# Patient Record
Sex: FEMALE | Race: WHITE | NOT HISPANIC OR LATINO | Employment: UNEMPLOYED | URBAN - METROPOLITAN AREA
[De-identification: names, ages, dates, MRNs, and addresses within clinical notes are randomized per-mention and may not be internally consistent; named-entity substitution may affect disease eponyms.]

---
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
## 2023-02-03 ENCOUNTER — HOSPITAL ENCOUNTER (EMERGENCY)
Facility: HOSPITAL | Age: 18
Discharge: HOME | End: 2023-02-03
Attending: STUDENT IN AN ORGANIZED HEALTH CARE EDUCATION/TRAINING PROGRAM
Payer: MEDICAID

## 2023-02-03 VITALS
SYSTOLIC BLOOD PRESSURE: 117 MMHG | HEIGHT: 58 IN | WEIGHT: 101 LBS | HEART RATE: 98 BPM | BODY MASS INDEX: 21.2 KG/M2 | DIASTOLIC BLOOD PRESSURE: 77 MMHG | OXYGEN SATURATION: 99 % | RESPIRATION RATE: 16 BRPM | TEMPERATURE: 99 F

## 2023-02-03 DIAGNOSIS — R11.10 HYPEREMESIS: Primary | ICD-10-CM

## 2023-02-03 LAB
ALBUMIN SERPL BCP-MCNC: 3.7 G/DL (ref 3.5–5.2)
ALP SERPL-CCNC: 40 U/L (ref 55–135)
ALT SERPL W/O P-5'-P-CCNC: 21 U/L (ref 10–44)
ANION GAP SERPL CALC-SCNC: 8 MMOL/L (ref 8–16)
AST SERPL-CCNC: 12 U/L (ref 10–40)
BACTERIA #/AREA URNS HPF: NEGATIVE /HPF
BASOPHILS # BLD AUTO: 0.06 K/UL (ref 0–0.2)
BASOPHILS NFR BLD: 0.4 % (ref 0–1.9)
BILIRUB SERPL-MCNC: 0.4 MG/DL (ref 0.1–1)
BILIRUB UR QL STRIP: NEGATIVE
BUN SERPL-MCNC: 8 MG/DL (ref 6–20)
CALCIUM SERPL-MCNC: 9.6 MG/DL (ref 8.7–10.5)
CHLORIDE SERPL-SCNC: 103 MMOL/L (ref 95–110)
CLARITY UR: ABNORMAL
CO2 SERPL-SCNC: 27 MMOL/L (ref 23–29)
COLOR UR: YELLOW
CREAT SERPL-MCNC: 0.6 MG/DL (ref 0.5–1.4)
DIFFERENTIAL METHOD: ABNORMAL
EOSINOPHIL # BLD AUTO: 0.3 K/UL (ref 0–0.5)
EOSINOPHIL NFR BLD: 1.7 % (ref 0–8)
ERYTHROCYTE [DISTWIDTH] IN BLOOD BY AUTOMATED COUNT: 12 % (ref 11.5–14.5)
EST. GFR  (NO RACE VARIABLE): >60 ML/MIN/1.73 M^2
GLUCOSE SERPL-MCNC: 86 MG/DL (ref 70–110)
GLUCOSE UR QL STRIP: NEGATIVE
HCT VFR BLD AUTO: 37.1 % (ref 37–48.5)
HGB BLD-MCNC: 13.1 G/DL (ref 12–16)
HGB UR QL STRIP: ABNORMAL
HYALINE CASTS #/AREA URNS LPF: 0 /LPF
IMM GRANULOCYTES # BLD AUTO: 0.07 K/UL (ref 0–0.04)
IMM GRANULOCYTES NFR BLD AUTO: 0.5 % (ref 0–0.5)
KETONES UR QL STRIP: >=160
LEUKOCYTE ESTERASE UR QL STRIP: NEGATIVE
LYMPHOCYTES # BLD AUTO: 1.6 K/UL (ref 1–4.8)
LYMPHOCYTES NFR BLD: 10.8 % (ref 18–48)
MCH RBC QN AUTO: 31.3 PG (ref 27–31)
MCHC RBC AUTO-ENTMCNC: 35.3 G/DL (ref 32–36)
MCV RBC AUTO: 89 FL (ref 82–98)
MICROSCOPIC COMMENT: ABNORMAL
MONOCYTES # BLD AUTO: 1.2 K/UL (ref 0.3–1)
MONOCYTES NFR BLD: 7.8 % (ref 4–15)
NEUTROPHILS # BLD AUTO: 11.7 K/UL (ref 1.8–7.7)
NEUTROPHILS NFR BLD: 78.8 % (ref 38–73)
NITRITE UR QL STRIP: NEGATIVE
NRBC BLD-RTO: 0 /100 WBC
PH UR STRIP: 7 [PH] (ref 5–8)
PLATELET # BLD AUTO: 340 K/UL (ref 150–450)
PMV BLD AUTO: 9.4 FL (ref 9.2–12.9)
POTASSIUM SERPL-SCNC: 4 MMOL/L (ref 3.5–5.1)
PROT SERPL-MCNC: 7.4 G/DL (ref 6–8.4)
PROT UR QL STRIP: ABNORMAL
RBC # BLD AUTO: 4.18 M/UL (ref 4–5.4)
RBC #/AREA URNS HPF: 9 /HPF (ref 0–4)
SODIUM SERPL-SCNC: 138 MMOL/L (ref 136–145)
SP GR UR STRIP: 1.02 (ref 1–1.03)
SQUAMOUS #/AREA URNS HPF: 10 /HPF
URN SPEC COLLECT METH UR: ABNORMAL
UROBILINOGEN UR STRIP-ACNC: 1 EU/DL
WBC # BLD AUTO: 14.79 K/UL (ref 3.9–12.7)
WBC #/AREA URNS HPF: 9 /HPF (ref 0–5)

## 2023-02-03 PROCEDURE — 85025 COMPLETE CBC W/AUTO DIFF WBC: CPT | Performed by: CLINICAL NURSE SPECIALIST

## 2023-02-03 PROCEDURE — 96374 THER/PROPH/DIAG INJ IV PUSH: CPT

## 2023-02-03 PROCEDURE — 81000 URINALYSIS NONAUTO W/SCOPE: CPT | Performed by: CLINICAL NURSE SPECIALIST

## 2023-02-03 PROCEDURE — 99284 EMERGENCY DEPT VISIT MOD MDM: CPT | Mod: 25

## 2023-02-03 PROCEDURE — 80053 COMPREHEN METABOLIC PANEL: CPT | Performed by: CLINICAL NURSE SPECIALIST

## 2023-02-03 PROCEDURE — 63600175 PHARM REV CODE 636 W HCPCS: Performed by: CLINICAL NURSE SPECIALIST

## 2023-02-03 PROCEDURE — 96361 HYDRATE IV INFUSION ADD-ON: CPT

## 2023-02-03 PROCEDURE — 36415 COLL VENOUS BLD VENIPUNCTURE: CPT | Performed by: CLINICAL NURSE SPECIALIST

## 2023-02-03 PROCEDURE — 25000003 PHARM REV CODE 250: Performed by: CLINICAL NURSE SPECIALIST

## 2023-02-03 RX ORDER — PROCHLORPERAZINE EDISYLATE 5 MG/ML
10 INJECTION INTRAMUSCULAR; INTRAVENOUS ONCE
Status: COMPLETED | OUTPATIENT
Start: 2023-02-03 | End: 2023-02-03

## 2023-02-03 RX ORDER — PROCHLORPERAZINE MALEATE 10 MG
10 TABLET ORAL EVERY 6 HOURS PRN
Qty: 15 TABLET | Refills: 0 | Status: ON HOLD | OUTPATIENT
Start: 2023-02-03 | End: 2023-08-26 | Stop reason: HOSPADM

## 2023-02-03 RX ADMIN — SODIUM CHLORIDE 1000 ML: 9 INJECTION, SOLUTION INTRAVENOUS at 07:02

## 2023-02-03 RX ADMIN — PROCHLORPERAZINE EDISYLATE 10 MG: 5 INJECTION INTRAMUSCULAR; INTRAVENOUS at 07:02

## 2023-02-04 NOTE — ED PROVIDER NOTES
Encounter Date: 2/3/2023       History     Chief Complaint   Patient presents with    Vomiting     9 weeks pregnant, has hyperemesis. Concerned about dehydration.      17-year-old female presents to the emergency room with hyperemesis, concern for dehydration.  Patient is proximally 7 weeks pregnant.  Patient states she has Phenergan at home but it just makes her sleepy does not help her nausea.  Patient also states that she is taking Zofran with no relief.  Patient reports OBGYN in [place].  Patient states she had an ultrasound to confirm placement previously    Review of patient's allergies indicates:  No Known Allergies  Past Medical History:   Diagnosis Date    Hyperemesis gravidarum      Past Surgical History:   Procedure Laterality Date    HERNIA REPAIR      SALPINGOOPHORECTOMY Left 8/16/2022    Procedure: SALPINGO-OOPHORECTOMY;  Surgeon: John Mcbride MD;  Location: [facility];  Service: OB/GYN;  Laterality: Left;  unilateral     TONSILLECTOMY       Family History   Problem Relation Age of Onset    No Known Problems Mother     No Known Problems Father      Social History     Tobacco Use    Smoking status: Some Days     Types: Vaping with nicotine    Smokeless tobacco: Never   Substance Use Topics    Alcohol use: Yes     Comment: occasional    Drug use: Not Currently     Review of Systems   Constitutional:  Negative for fever.   HENT:  Negative for sore throat.    Respiratory:  Negative for shortness of breath.    Cardiovascular:  Negative for chest pain.   Gastrointestinal:  Positive for nausea and vomiting.   Genitourinary:  Negative for dysuria.   Musculoskeletal:  Negative for back pain.   Skin:  Negative for rash.   Neurological:  Negative for weakness.   Hematological:  Does not bruise/bleed easily.   All other systems reviewed and are negative.    Physical Exam     Initial Vitals [02/03/23 1911]   BP Pulse Resp Temp SpO2   117/77 109 18 98.6 °F (37 °C) 99 %      MAP       --         Physical  Exam    Nursing note and vitals reviewed.  Constitutional: She appears well-developed and well-nourished.   HENT:   Head: Normocephalic and atraumatic.   Eyes: Pupils are equal, round, and reactive to light.   Neck:   Normal range of motion.  Cardiovascular:  Normal rate and regular rhythm.           Pulmonary/Chest: Breath sounds normal.   Abdominal: Abdomen is soft. Bowel sounds are normal.   Musculoskeletal:         General: Normal range of motion.      Cervical back: Normal range of motion.     Neurological: She is alert and oriented to person, place, and time.   Skin: Skin is warm and dry.   Psychiatric: She has a normal mood and affect.       ED Course   Procedures  Labs Reviewed   CBC W/ AUTO DIFFERENTIAL - Abnormal; Notable for the following components:       Result Value    WBC 14.79 (*)     MCH 31.3 (*)     Gran # (ANC) 11.7 (*)     Immature Grans (Abs) 0.07 (*)     Mono # 1.2 (*)     Gran % 78.8 (*)     Lymph % 10.8 (*)     All other components within normal limits   COMPREHENSIVE METABOLIC PANEL - Abnormal; Notable for the following components:    Alkaline Phosphatase 40 (*)     All other components within normal limits   URINALYSIS, REFLEX TO URINE CULTURE - Abnormal; Notable for the following components:    Appearance, UA Cloudy (*)     Protein, UA 1+ (*)     Occult Blood UA Trace (*)     All other components within normal limits    Narrative:     Preferred Collection Type->Urine, Clean Catch  Specimen Source->Urine   URINALYSIS MICROSCOPIC - Abnormal; Notable for the following components:    RBC, UA 9 (*)     WBC, UA 9 (*)     All other components within normal limits    Narrative:     Preferred Collection Type->Urine, Clean Catch  Specimen Source->Urine          Imaging Results    None          Medications   sodium chloride 0.9% bolus 1,000 mL 1,000 mL (0 mLs Intravenous Stopped 2/3/23 2036)   prochlorperazine injection Soln 10 mg (10 mg Intravenous Given 2/3/23 1933)     Medical Decision Making:    Differential Diagnosis:   Hyperemesis, dehydration  Clinical Tests:   Lab Tests: Ordered and Reviewed                        Clinical Impression:   Final diagnoses:  [R11.10] Hyperemesis (Primary)        ED Disposition Condition    Discharge Stable          ED Prescriptions       Medication Sig Dispense Start Date End Date Auth. Provider    prochlorperazine (COMPAZINE) 10 MG tablet Take 1 tablet (10 mg total) by mouth every 6 (six) hours as needed (nausea/vomiting during pregnancy). 15 tablet 2/3/2023 -- Cierra Layton NP          Follow-up Information    None          Cierra Layton NP  02/03/23 1926

## 2023-03-17 ENCOUNTER — HOSPITAL ENCOUNTER (EMERGENCY)
Facility: HOSPITAL | Age: 18
Discharge: HOME | End: 2023-03-17
Attending: EMERGENCY MEDICINE
Payer: MEDICAID

## 2023-03-17 VITALS
DIASTOLIC BLOOD PRESSURE: 78 MMHG | BODY MASS INDEX: 21.96 KG/M2 | HEIGHT: 58 IN | HEART RATE: 89 BPM | WEIGHT: 104.63 LBS | TEMPERATURE: 98 F | OXYGEN SATURATION: 99 % | RESPIRATION RATE: 18 BRPM | SYSTOLIC BLOOD PRESSURE: 107 MMHG

## 2023-03-17 DIAGNOSIS — R55 NEAR SYNCOPE: ICD-10-CM

## 2023-03-17 DIAGNOSIS — R51.9 ACUTE NONINTRACTABLE HEADACHE, UNSPECIFIED HEADACHE TYPE: Primary | ICD-10-CM

## 2023-03-17 LAB
CTP QC/QA: YES
SARS-COV-2 RDRP RESP QL NAA+PROBE: NEGATIVE

## 2023-03-17 PROCEDURE — 99283 EMERGENCY DEPT VISIT LOW MDM: CPT

## 2023-03-17 PROCEDURE — 25000003 PHARM REV CODE 250: Performed by: EMERGENCY MEDICINE

## 2023-03-17 RX ORDER — ACETAMINOPHEN 500 MG
1000 TABLET ORAL EVERY 8 HOURS PRN
Qty: 30 TABLET | Refills: 0 | Status: ON HOLD | OUTPATIENT
Start: 2023-03-17 | End: 2023-08-26 | Stop reason: HOSPADM

## 2023-03-17 RX ORDER — BUTALBITAL, ACETAMINOPHEN AND CAFFEINE 50; 325; 40 MG/1; MG/1; MG/1
1 TABLET ORAL
Status: COMPLETED | OUTPATIENT
Start: 2023-03-17 | End: 2023-03-17

## 2023-03-17 RX ADMIN — BUTALBITAL, ACETAMINOPHEN, AND CAFFEINE 1 TABLET: 50; 325; 40 TABLET ORAL at 10:03

## 2023-03-17 NOTE — ED PROVIDER NOTES
EMERGENCY DEPARTMENT HISTORY AND PHYSICAL EXAM     This note is dictated on M*Modal word recognition program.  There are word recognition mistakes and grammatical errors that are occasionally missed on review.     Date: 3/17/2023   Patient Name: Lilly Lockwood       History of Presenting Illness      Chief Complaint   Patient presents with    Headache     Complains of generalized headache since waking up this morning. Patient is 15 wks pregnant. SOHAMCAROLINA Rubio.         [id number]   Lilly Lockwood is a 17 y.o. female with PMHX of  left Salpingo-oophorectom who presents to the emergency department C/O headache.    Patient states she woke up with a headache this morning.  Described as posterior and temporal headache.  No vomiting.  No sick contacts.  No fever.    Patient reports episodes of emesis during this pregnancy and dehydration requiring IV fluids.  She is been tolerating p.o..  She states a couple days ago she got dizzy and lightheaded after fishing.  She almost passed out.  She reports having ear ringing during this episode.      Patient is  at 15 weeks. Normal US this pregnancy.     PCP: [facility] - records        Current Facility-Administered Medications   Medication Dose Route Frequency Provider Last Rate Last Admin    butalbital-acetaminophen-caffeine -40 mg per tablet 1 tablet  1 tablet Oral ED 1 Time Armani Laboy MD         Current Outpatient Medications   Medication Sig Dispense Refill    doxylamine-pyridoxine, vit B6, (DICLEGIS) 10-10 mg TbEC Take 2 tablets by mouth every evening. 20 tablet 0    prochlorperazine (COMPAZINE) 10 MG tablet Take 1 tablet (10 mg total) by mouth every 6 (six) hours as needed (nausea/vomiting during pregnancy). 15 tablet 0    promethazine (PHENERGAN) 25 MG suppository Place 1 suppository (25 mg total) rectally every 6 (six) hours as needed for Nausea. 10 suppository 0           Past History     Past Medical History:   Past Medical  "History:   Diagnosis Date    Hyperemesis gravidarum         Past Surgical History:   Past Surgical History:   Procedure Laterality Date    HERNIA REPAIR      SALPINGOOPHORECTOMY Left 8/16/2022    Procedure: SALPINGO-OOPHORECTOMY;  Surgeon: John Mcbride MD;  [facility];  Service: OB/GYN;  Laterality: Left;  unilateral     TONSILLECTOMY          Family History:   Family History   Problem Relation Age of Onset    No Known Problems Mother     No Known Problems Father         Social History:   Social History     Tobacco Use    Smoking status: Some Days     Types: Vaping with nicotine    Smokeless tobacco: Never   Substance Use Topics    Alcohol use: Yes     Comment: occasional    Drug use: Not Currently        Allergies:   Review of patient's allergies indicates:  No Known Allergies       Review of Systems   Review of Systems   See HPI for pertinent positives and negatives       Physical Exam     Vitals:    03/17/23 0939   BP: 107/78   Pulse: 89   Resp: 18   Temp: 98.3 °F (36.8 °C)   SpO2: 99%   Weight: 47.4 kg (104 lb 9.6 oz)   Height: 4' 10" (1.473 m)      Physical Exam  Vitals and nursing note reviewed.   Constitutional:       General: She is not in acute distress.     Appearance: Normal appearance. She is not ill-appearing.   HENT:      Head: Normocephalic and atraumatic.      Right Ear: External ear normal.      Left Ear: External ear normal.      Nose: Nose normal. No congestion or rhinorrhea.      Mouth/Throat:      Mouth: Mucous membranes are moist.   Eyes:      Conjunctiva/sclera: Conjunctivae normal.      Pupils: Pupils are equal, round, and reactive to light.   Pulmonary:      Effort: Pulmonary effort is normal. No respiratory distress.   Musculoskeletal:         General: No deformity. Normal range of motion.      Cervical back: Normal range of motion. No rigidity.   Skin:     General: Skin is dry.      Capillary Refill: Capillary refill takes less than 2 seconds.   Neurological:      General: No " focal deficit present.      Mental Status: She is alert and oriented to person, place, and time. Mental status is at baseline.      Cranial Nerves: No cranial nerve deficit.      Sensory: No sensory deficit.      Motor: No weakness.      Coordination: Coordination normal.   Psychiatric:         Mood and Affect: Mood normal.         Behavior: Behavior normal.            Diagnostic Study Results      Labs -   No results found for this or any previous visit (from the past 12 hour(s)).     Radiologic Studies -    No orders to display        Medications given in the ED-   Medications   butalbital-acetaminophen-caffeine -40 mg per tablet 1 tablet (has no administration in time range)           Medical Decision Making    I am the first provider for this patient.     I reviewed the vital signs, available nursing notes, past medical history, past surgical history, family history and social history.     Vital Signs:  Reviewed the patient's vital signs.     Pulse Oximetry Analysis and Interpretation:    99% on Room Air, normal        External Test Results (Pertinent to encounter):    Records Reviewed: Nursing Notes, Current Prescription Medications, Old Medical Records, and External Medical Records     History Obtained By: Patient    Provider Notes: Lilly Lockwood is a 17 y.o. female with headache    Co-morbidities Considered: pregnancy    Differential Diagnosis:  Tension headache, migraine, viral syndrome      ED Course:    Patient well-appearing.  Vital signs within normal limits.  No focal neurological deficits.  Able use cellphone without difficulty.  Plan on symptomatic management.  Discussed management of headaches and pregnancy.  Will give dose of Fioricet in ED and patient can take acetaminophen at home as needed for headache.  Will screen for COVID.  No red flag symptoms for headache such as weakness, change sensation, acute nausea and vomiting, neck pain, fever. AVSS.         Problems  Addressed:      Procedures:   Procedures       Diagnosis and Disposition     Critical Care:      DISCHARGE NOTE:       Lilly Lockwood's  results have been reviewed with her.  She has been counseled regarding her diagnosis, treatment, and plan.  She verbally conveys understanding and agreement of the signs, symptoms, diagnosis, treatment and prognosis and additionally agrees to follow up as discussed.  She also agrees with the care-plan and conveys that all of her questions have been answered.  I have also provided discharge instructions for her that include: educational information regarding their diagnosis and treatment, and list of reasons why they would want to return to the ED prior to their follow-up appointment, should her condition change. She has been provided with education for proper emergency department utilization.         CLINICAL IMPRESSION:         1. Acute nonintractable headache, unspecified headache type    2. Near syncope              PLAN:   1. Discharge Home  2.      Medication List        ASK your doctor about these medications      doxylamine-pyridoxine (vit B6) 10-10 mg Tbec  Commonly known as: DICLEGIS  Take 2 tablets by mouth every evening.     prochlorperazine 10 MG tablet  Commonly known as: COMPAZINE  Take 1 tablet (10 mg total) by mouth every 6 (six) hours as needed (nausea/vomiting during pregnancy).     promethazine 25 MG suppository  Commonly known as: PHENERGAN  Place 1 suppository (25 mg total) rectally every 6 (six) hours as needed for Nausea.             3. No follow-up provider specified.     _______________________________     Please note that this dictation was completed with [employer], the computer voice recognition software.  Quite often unanticipated grammatical, syntax, homophones, and other interpretive errors are inadvertently transcribed by the computer software.  Please disregard these errors.  Please excuse any errors that have escaped final proofreading.              Armani Laboy MD  03/17/23 6516

## 2023-08-25 PROBLEM — O47.9 UTERINE CONTRACTIONS DURING PREGNANCY: Status: ACTIVE | Noted: 2023-08-25

## 2023-10-29 NOTE — DISCHARGE INSTRUCTIONS
Information to Prepare you for your Surgery    PRE-ADMIT TESTING -  [phone]    [address] [facility]          Your surgery has been scheduled at [facility]. We are pleased to have the opportunity to serve you. For Further Information please call [phone].    On the day of surgery please report to the Information Desk on the 1st floor.    CONTACT YOUR PHYSICIAN'S OFFICE THE DAY PRIOR TO YOUR SURGERY TO OBTAIN YOUR ARRIVAL TIME.     The evening before surgery do not eat anything after 9 p.m. ( this includes hard candy, chewing gum and mints).  You may only have GATORADE, POWERADE AND WATER  from 9 p.m. until you leave your home.   DO NOT DRINK ANY LIQUIDS ON THE WAY TO THE HOSPITAL.      Why does your anesthesiologist allow you to drink Gatorade/Powerade before surgery?  Gatorade/Powerade helps to increase your comfort before surgery and to decrease your nausea after surgery. The carbohydrates in Gatorade/Powerade help reduce your body's stress response to surgery.  If you are a diabetic-drink only water prior to surgery.      Current Visitor policy(12/27/2021) - Patients may have 2 visitors pre and post procedure. Only 2 visitors will be allowed in the Surgical building with the patient.     SPECIAL MEDICATION INSTRUCTIONS: TAKE medications checked off by the Anesthesiologist on your Medication List.    Angiogram Patients: Take medications as instructed by your physician, including aspirin.     Surgery Patients:    If you take ASPIRIN - Your PHYSICIAN/SURGEON will need to inform you IF/OR when you need to stop taking aspirin prior to your surgery.     Do Not take any medications containing IBUPROFEN.    Do Not Wear any make-up (especially eye make-up) to surgery. Please remove any false eyelashes or eyelash extensions. If you arrive the day of surgery with makeup/eyelashes on you will be required to remove prior to surgery. (There is a risk of corneal  abrasions if eye makeup/eyelash extensions are not removed)      Leave all valuables at home.   Do Not wear any jewelry or watches, including any metal in body piercings. Jewelry must be removed prior to coming to the hospital.  There is a possibility that rings that are unable to be removed may be cut off if they are on the surgical extremity.    Please remove all hair extensions, wigs, clips and any other metal accessories/ ornaments from your hair.  These items may pose a flammable/fire risk in Surgery and must be removed.    Do not shave your surgical area at least 5 days prior to your surgery. The surgical prep will be performed at the hospital according to Infection Control regulations.    Contact Lens must be removed before surgery. Either do not wear the contact lens or bring a case and solution for storage.  Please bring a container for eyeglasses or dentures as required.  Bring any paperwork your physician has provided, such as consent forms,  history and physicals, doctor's orders, etc.   Bring comfortable clothes that are loose fitting to wear upon discharge. Take into consideration the type of surgery being performed.  Maintain your diet as advised per your physician the day prior to surgery.      Adequate rest the night before surgery is advised.   Park in the Parking lot behind the hospital or in the [employer] [facility] across the street from the parking lot. Parking is complimentary.  If you will be discharged the same day as your procedure, please arrange for a responsible adult to drive you home or to accompany you if traveling by taxi.   YOU WILL NOT BE PERMITTED TO DRIVE OR TO LEAVE THE HOSPITAL ALONE AFTER SURGERY.   If you are being discharged the same day, it is strongly recommended that you arrange for someone to remain with you for the first 24 hrs following your surgery.    The Surgeon will speak to your family/visitor after your surgery regarding the outcome of your surgery and post op  care.  The Surgeon may speak to you after your surgery, but there is a possibility you may not remember the details.  Please check with your family members regarding the conversation with the Surgeon.    We strongly recommend whoever is bringing you home be present for discharge instructions.  This will ensure a thorough understanding for your post op home care.    ALL CHILDREN MUST ALWAYS BE ACCOMPANIED BY AN ADULT.    Visitors-Refer to current Visitor policy handouts.    Thank you for your cooperation.  The Staff of [facility].            Bathing Instructions with Hibiclens    Shower the evening before and morning of your procedure with Hibiclens:  Wash your face with water and your regular face wash/soap  Apply Hibiclens directly on your skin or on a wet washcloth and wash gently. When showering: Move away from the shower stream when applying Hibiclens to avoid rinsing off too soon.  Rinse thoroughly with warm water  Do not dilute Hibiclens        Dry off as usual, do not use any deodorant, powder, body lotions, perfume, after shave or cologne.                  No.

## 2023-10-31 ENCOUNTER — HOSPITAL ENCOUNTER (EMERGENCY)
Facility: HOSPITAL | Age: 18
Discharge: HOME | End: 2023-10-31
Attending: STUDENT IN AN ORGANIZED HEALTH CARE EDUCATION/TRAINING PROGRAM
Payer: MEDICAID

## 2023-10-31 VITALS
HEART RATE: 67 BPM | HEIGHT: 59 IN | SYSTOLIC BLOOD PRESSURE: 118 MMHG | WEIGHT: 120 LBS | TEMPERATURE: 99 F | DIASTOLIC BLOOD PRESSURE: 64 MMHG | RESPIRATION RATE: 18 BRPM | BODY MASS INDEX: 24.19 KG/M2 | OXYGEN SATURATION: 100 %

## 2023-10-31 DIAGNOSIS — N20.0 KIDNEY STONE: Primary | ICD-10-CM

## 2023-10-31 LAB
ALBUMIN SERPL BCP-MCNC: 4.2 G/DL (ref 3.5–5.2)
ALP SERPL-CCNC: 70 U/L (ref 55–135)
ALT SERPL W/O P-5'-P-CCNC: 21 U/L (ref 10–44)
ANION GAP SERPL CALC-SCNC: 1 MMOL/L (ref 3–11)
AST SERPL-CCNC: 9 U/L (ref 10–40)
B-HCG UR QL: NEGATIVE
BACTERIA #/AREA URNS HPF: NEGATIVE /HPF
BASOPHILS # BLD AUTO: 0.07 K/UL (ref 0–0.2)
BASOPHILS NFR BLD: 0.7 % (ref 0–1.9)
BILIRUB SERPL-MCNC: 0.2 MG/DL (ref 0.1–1)
BILIRUB UR QL STRIP: NEGATIVE
BUN SERPL-MCNC: 12 MG/DL (ref 6–20)
CALCIUM SERPL-MCNC: 8.9 MG/DL (ref 8.7–10.5)
CHLORIDE SERPL-SCNC: 112 MMOL/L (ref 95–110)
CLARITY UR: CLEAR
CO2 SERPL-SCNC: 27 MMOL/L (ref 23–29)
COLOR UR: YELLOW
CREAT SERPL-MCNC: 1.1 MG/DL (ref 0.5–1.4)
DIFFERENTIAL METHOD: ABNORMAL
EOSINOPHIL # BLD AUTO: 0.3 K/UL (ref 0–0.5)
EOSINOPHIL NFR BLD: 3.1 % (ref 0–8)
ERYTHROCYTE [DISTWIDTH] IN BLOOD BY AUTOMATED COUNT: 15.6 % (ref 11.5–14.5)
EST. GFR  (NO RACE VARIABLE): >60 ML/MIN/1.73 M^2
GLUCOSE SERPL-MCNC: 78 MG/DL (ref 70–110)
GLUCOSE UR QL STRIP: NEGATIVE
HCT VFR BLD AUTO: 37.3 % (ref 37–48.5)
HGB BLD-MCNC: 12 G/DL (ref 12–16)
HGB UR QL STRIP: ABNORMAL
HYALINE CASTS #/AREA URNS LPF: 1.8 /LPF
IMM GRANULOCYTES # BLD AUTO: 0.02 K/UL (ref 0–0.04)
IMM GRANULOCYTES NFR BLD AUTO: 0.2 % (ref 0–0.5)
KETONES UR QL STRIP: NEGATIVE
LEUKOCYTE ESTERASE UR QL STRIP: ABNORMAL
LYMPHOCYTES # BLD AUTO: 3.7 K/UL (ref 1–4.8)
LYMPHOCYTES NFR BLD: 37.8 % (ref 18–48)
MCH RBC QN AUTO: 27.8 PG (ref 27–31)
MCHC RBC AUTO-ENTMCNC: 32.2 G/DL (ref 32–36)
MCV RBC AUTO: 86 FL (ref 82–98)
MICROSCOPIC COMMENT: ABNORMAL
MONOCYTES # BLD AUTO: 1.1 K/UL (ref 0.3–1)
MONOCYTES NFR BLD: 11.6 % (ref 4–15)
NEUTROPHILS # BLD AUTO: 4.5 K/UL (ref 1.8–7.7)
NEUTROPHILS NFR BLD: 46.6 % (ref 38–73)
NITRITE UR QL STRIP: NEGATIVE
NRBC BLD-RTO: 0 /100 WBC
PH UR STRIP: 7 [PH] (ref 5–8)
PLATELET # BLD AUTO: 383 K/UL (ref 150–450)
PMV BLD AUTO: 9.9 FL (ref 9.2–12.9)
POTASSIUM SERPL-SCNC: 4.3 MMOL/L (ref 3.5–5.1)
PROT SERPL-MCNC: 7.3 G/DL (ref 6–8.4)
PROT UR QL STRIP: ABNORMAL
RBC # BLD AUTO: 4.32 M/UL (ref 4–5.4)
RBC #/AREA URNS HPF: 31 /HPF (ref 0–4)
SODIUM SERPL-SCNC: 140 MMOL/L (ref 136–145)
SP GR UR STRIP: 1.02 (ref 1–1.03)
SQUAMOUS #/AREA URNS HPF: 4 /HPF
URN SPEC COLLECT METH UR: ABNORMAL
UROBILINOGEN UR STRIP-ACNC: 1 EU/DL
WBC # BLD AUTO: 9.73 K/UL (ref 3.9–12.7)
WBC #/AREA URNS HPF: 7 /HPF (ref 0–5)

## 2023-10-31 PROCEDURE — 96361 HYDRATE IV INFUSION ADD-ON: CPT

## 2023-10-31 PROCEDURE — 36415 COLL VENOUS BLD VENIPUNCTURE: CPT | Performed by: NURSE PRACTITIONER

## 2023-10-31 PROCEDURE — 63600175 PHARM REV CODE 636 W HCPCS: Performed by: NURSE PRACTITIONER

## 2023-10-31 PROCEDURE — 80053 COMPREHEN METABOLIC PANEL: CPT | Performed by: NURSE PRACTITIONER

## 2023-10-31 PROCEDURE — 99285 EMERGENCY DEPT VISIT HI MDM: CPT | Mod: 25

## 2023-10-31 PROCEDURE — 81000 URINALYSIS NONAUTO W/SCOPE: CPT | Performed by: STUDENT IN AN ORGANIZED HEALTH CARE EDUCATION/TRAINING PROGRAM

## 2023-10-31 PROCEDURE — 25000003 PHARM REV CODE 250: Performed by: NURSE PRACTITIONER

## 2023-10-31 PROCEDURE — 96374 THER/PROPH/DIAG INJ IV PUSH: CPT

## 2023-10-31 PROCEDURE — 81025 URINE PREGNANCY TEST: CPT | Performed by: STUDENT IN AN ORGANIZED HEALTH CARE EDUCATION/TRAINING PROGRAM

## 2023-10-31 PROCEDURE — 85025 COMPLETE CBC W/AUTO DIFF WBC: CPT | Performed by: NURSE PRACTITIONER

## 2023-10-31 RX ORDER — KETOROLAC TROMETHAMINE 30 MG/ML
15 INJECTION, SOLUTION INTRAMUSCULAR; INTRAVENOUS
Status: COMPLETED | OUTPATIENT
Start: 2023-10-31 | End: 2023-10-31

## 2023-10-31 RX ORDER — ONDANSETRON 4 MG/1
4 TABLET, FILM COATED ORAL EVERY 6 HOURS
Qty: 12 TABLET | Refills: 0 | Status: SHIPPED | OUTPATIENT
Start: 2023-10-31 | End: 2023-10-31 | Stop reason: SDUPTHER

## 2023-10-31 RX ORDER — TAMSULOSIN HYDROCHLORIDE 0.4 MG/1
0.4 CAPSULE ORAL
Status: COMPLETED | OUTPATIENT
Start: 2023-10-31 | End: 2023-10-31

## 2023-10-31 RX ORDER — KETOROLAC TROMETHAMINE 10 MG/1
10 TABLET, FILM COATED ORAL EVERY 6 HOURS
Qty: 20 TABLET | Refills: 0 | Status: SHIPPED | OUTPATIENT
Start: 2023-10-31 | End: 2023-11-05

## 2023-10-31 RX ORDER — SODIUM CHLORIDE 9 MG/ML
1000 INJECTION, SOLUTION INTRAVENOUS
Status: COMPLETED | OUTPATIENT
Start: 2023-10-31 | End: 2023-10-31

## 2023-10-31 RX ORDER — HYDROCODONE BITARTRATE AND ACETAMINOPHEN 5; 325 MG/1; MG/1
1 TABLET ORAL EVERY 8 HOURS PRN
Qty: 14 TABLET | Refills: 0 | Status: SHIPPED | OUTPATIENT
Start: 2023-10-31 | End: 2023-11-05

## 2023-10-31 RX ORDER — TAMSULOSIN HYDROCHLORIDE 0.4 MG/1
0.4 CAPSULE ORAL DAILY
Qty: 10 CAPSULE | Refills: 0 | Status: ON HOLD | OUTPATIENT
Start: 2023-10-31 | End: 2023-12-13 | Stop reason: HOSPADM

## 2023-10-31 RX ORDER — HYDROCODONE BITARTRATE AND ACETAMINOPHEN 5; 325 MG/1; MG/1
1 TABLET ORAL EVERY 6 HOURS PRN
Qty: 12 TABLET | Refills: 0 | Status: SHIPPED | OUTPATIENT
Start: 2023-10-31 | End: 2023-10-31 | Stop reason: SDUPTHER

## 2023-10-31 RX ORDER — KETOROLAC TROMETHAMINE 10 MG/1
10 TABLET, FILM COATED ORAL EVERY 6 HOURS
Qty: 12 TABLET | Refills: 0 | Status: SHIPPED | OUTPATIENT
Start: 2023-10-31 | End: 2023-10-31 | Stop reason: SDUPTHER

## 2023-10-31 RX ORDER — ONDANSETRON 4 MG/1
4 TABLET, FILM COATED ORAL EVERY 6 HOURS PRN
Qty: 20 TABLET | Refills: 0 | Status: SHIPPED | OUTPATIENT
Start: 2023-10-31 | End: 2023-11-05

## 2023-10-31 RX ORDER — ONDANSETRON 2 MG/ML
4 INJECTION INTRAMUSCULAR; INTRAVENOUS
Status: DISCONTINUED | OUTPATIENT
Start: 2023-10-31 | End: 2023-10-31 | Stop reason: HOSPADM

## 2023-10-31 RX ADMIN — SODIUM CHLORIDE 1000 ML: 9 INJECTION, SOLUTION INTRAVENOUS at 08:10

## 2023-10-31 RX ADMIN — KETOROLAC TROMETHAMINE 15 MG: 30 INJECTION, SOLUTION INTRAMUSCULAR; INTRAVENOUS at 08:10

## 2023-10-31 RX ADMIN — TAMSULOSIN HYDROCHLORIDE 0.4 MG: 0.4 CAPSULE ORAL at 09:10

## 2023-11-01 NOTE — ED PROVIDER NOTES
"Encounter Date: 10/31/2023       History     Chief Complaint   Patient presents with    Flank Pain     Right flank pain, "sharp" for an hour.       Dysuria   This is a new problem. The current episode started just prior to arrival. The problem occurs every urination. The problem has been rapidly worsening. The quality of the pain is described as shooting and aching. The pain is at a severity of 10/10. She is Sexually active. There is No history of pyelonephritis. Associated symptoms include flank pain. She has tried nothing for the symptoms. Her past medical history is significant for recurrent UTIs.     Review of patient's allergies indicates:  No Known Allergies  Past Medical History:   Diagnosis Date    COVID     HX OF    Hyperemesis gravidarum      Past Surgical History:   Procedure Laterality Date    HERNIA REPAIR      SALPINGOOPHORECTOMY Left 8/16/2022    Procedure: SALPINGO-OOPHORECTOMY;  Surgeon: John Mcbride MD;  Location: [facility];  Service: OB/GYN;  Laterality: Left;  unilateral     TONSILLECTOMY       Family History   Problem Relation Age of Onset    No Known Problems Mother     No Known Problems Father      Social History     Tobacco Use    Smoking status: Former     Types: Vaping with nicotine    Smokeless tobacco: Never   Substance Use Topics    Alcohol use: Not Currently    Drug use: Not Currently     Review of Systems   Genitourinary:  Positive for difficulty urinating, dysuria and flank pain.   All other systems reviewed and are negative.      Physical Exam     Initial Vitals   BP Pulse Resp Temp SpO2   10/31/23 2006 10/31/23 2005 10/31/23 2005 10/31/23 2005 10/31/23 2005   (!) 121/92 106 18 98.6 °F (37 °C) 99 %      MAP       --                Physical Exam    Nursing note and vitals reviewed.  Constitutional: She appears well-developed and well-nourished. She is cooperative.   Tearful and appears uncomfortable   HENT:   Head: Normocephalic and atraumatic.   Eyes: Conjunctivae are normal. "   Cardiovascular:  Normal heart sounds and intact distal pulses.   Tachycardia present.         Pulmonary/Chest: Breath sounds normal.   Abdominal: Abdomen is soft. Bowel sounds are normal.   Musculoskeletal:         General: Normal range of motion.     Neurological: She is alert and oriented to person, place, and time.   Skin: Skin is warm and dry.   Psychiatric: Judgment and thought content normal. Her mood appears anxious. Her speech is rapid and/or pressured. She is hyperactive. Cognition and memory are normal.         ED Course   Procedures  Labs Reviewed   URINALYSIS, REFLEX TO URINE CULTURE - Abnormal; Notable for the following components:       Result Value    Protein, UA Trace (*)     Occult Blood UA 2+ (*)     Leukocytes, UA 1+ (*)     All other components within normal limits    Narrative:     Preferred Collection Type->Urine, Clean Catch  Specimen Source->Urine   URINALYSIS MICROSCOPIC - Abnormal; Notable for the following components:    RBC, UA 31 (*)     WBC, UA 7 (*)     Hyaline Casts, UA 1.8 (*)     All other components within normal limits    Narrative:     Preferred Collection Type->Urine, Clean Catch  Specimen Source->Urine   CBC W/ AUTO DIFFERENTIAL - Abnormal; Notable for the following components:    RDW 15.6 (*)     Mono # 1.1 (*)     All other components within normal limits   COMPREHENSIVE METABOLIC PANEL - Abnormal; Notable for the following components:    Chloride 112 (*)     AST 9 (*)     Anion Gap 1 (*)     All other components within normal limits   PREGNANCY TEST, URINE RAPID    Narrative:     Specimen Source->Urine          Imaging Results              CT Renal Stone Study ABD Pelvis WO (In process)  Result time 10/31/23 20:55:34                       Medications   ondansetron injection 4 mg (0 mg Intravenous Hold 10/31/23 2045)   tamsulosin 24 hr capsule 0.4 mg (has no administration in time range)   0.9%  NaCl infusion (1,000 mLs Intravenous New Bag 10/31/23 2028)   ketorolac  injection 15 mg (15 mg Intravenous Given 10/31/23 2027)     Medical Decision Making  Acute right flank pain this pm and dysuria     Amount and/or Complexity of Data Reviewed  Labs: ordered. Decision-making details documented in ED Course.  Radiology: ordered.  Discussion of management or test interpretation with external provider(s): CT noted. Discussed with my attending. Re-evaluated pt and she is now calm and symptom free. Offered transfer but she would rather go home and try to pass. Strict return for any new or worsening symptoms. Strain urine. If does not pass stone needs to follow-up with urologist of choice or return to ER for further therapies. Patient and S/O voiced understanding. She is not breast feeding and medications sent to pharmacy. Drink plenty of fluids.     Risk  Prescription drug management.               ED Course as of 10/31/23 2130   Tue Oct 31, 2023   2032 Leukocytes, UA(!): 1+ [NA]   2032 NITRITE UA: Negative [NA]   2032 Occult Blood UA(!): 2+ [NA]   2032 RBC, UA(!): 31 [NA]      ED Course User Index  [NA] Yevgeniy Rogers MD                    Clinical Impression:   Final diagnoses:  [N20.0] Kidney stone (Primary)        ED Disposition Condition    Discharge Stable          ED Prescriptions       Medication Sig Dispense Start Date End Date Auth. Provider    tamsulosin (FLOMAX) 0.4 mg Cap Take 1 capsule (0.4 mg total) by mouth once daily. for 10 days 10 capsule 10/31/2023 11/10/2023 Baljeet Milligan NP    ketorolac (TORADOL) 10 mg tablet Take 1 tablet (10 mg total) by mouth every 6 (six) hours. for 3 days 12 tablet 10/31/2023 11/3/2023 Baljeet Milligan NP    HYDROcodone-acetaminophen (NORCO) 5-325 mg per tablet Take 1 tablet by mouth every 6 (six) hours as needed for Pain. 12 tablet 10/31/2023 -- Baljeet Milligan NP    ondansetron (ZOFRAN) 4 MG tablet Take 1 tablet (4 mg total) by mouth every 6 (six) hours. 12 tablet 10/31/2023 -- Baljeet Milligan NP          Follow-up Information       Follow  up With Specialties Details Why Contact Info Additional Information    [place] - Emergency Department Emergency Medicine In 2 days As needed, If symptoms worsen [address] [facility] [address] [phone] Floor 1             Baljeet Milligan, KITTY  10/31/23 1542

## 2023-12-10 ENCOUNTER — HOSPITAL ENCOUNTER (INPATIENT)
Facility: HOSPITAL | Age: 18
LOS: 3 days | Discharge: HOME | DRG: 882 | End: 2023-12-13
Attending: EMERGENCY MEDICINE | Admitting: EMERGENCY MEDICINE
Payer: MEDICAID

## 2023-12-10 DIAGNOSIS — Z00.8 MEDICAL CLEARANCE FOR PSYCHIATRIC ADMISSION: ICD-10-CM

## 2023-12-10 DIAGNOSIS — R45.851 SUICIDAL IDEATION: Primary | ICD-10-CM

## 2023-12-10 LAB
ALBUMIN SERPL BCP-MCNC: 3.6 G/DL (ref 3.5–5.2)
ALP SERPL-CCNC: 44 U/L (ref 55–135)
ALT SERPL W/O P-5'-P-CCNC: 21 U/L (ref 10–44)
AMPHET+METHAMPHET UR QL: NEGATIVE
ANION GAP SERPL CALC-SCNC: 6 MMOL/L (ref 3–11)
APAP SERPL-MCNC: <2 UG/ML (ref 10–20)
AST SERPL-CCNC: 13 U/L (ref 10–40)
B-HCG UR QL: NEGATIVE
BACTERIA #/AREA URNS HPF: NEGATIVE /HPF
BARBITURATES UR QL SCN>200 NG/ML: NEGATIVE
BASOPHILS # BLD AUTO: 0.08 K/UL (ref 0–0.2)
BASOPHILS NFR BLD: 0.6 % (ref 0–1.9)
BENZODIAZ UR QL SCN>200 NG/ML: ABNORMAL
BILIRUB SERPL-MCNC: 0.3 MG/DL (ref 0.1–1)
BILIRUB UR QL STRIP: NEGATIVE
BUN SERPL-MCNC: 14 MG/DL (ref 6–20)
BZE UR QL SCN: NEGATIVE
CALCIUM SERPL-MCNC: 8.6 MG/DL (ref 8.7–10.5)
CANNABINOIDS UR QL SCN: ABNORMAL
CHLORIDE SERPL-SCNC: 108 MMOL/L (ref 95–110)
CLARITY UR: CLEAR
CO2 SERPL-SCNC: 26 MMOL/L (ref 23–29)
COLOR UR: YELLOW
CREAT SERPL-MCNC: 1 MG/DL (ref 0.5–1.4)
CREAT UR-MCNC: 137 MG/DL (ref 15–325)
DIFFERENTIAL METHOD: ABNORMAL
EOSINOPHIL # BLD AUTO: 0 K/UL (ref 0–0.5)
EOSINOPHIL NFR BLD: 0.1 % (ref 0–8)
ERYTHROCYTE [DISTWIDTH] IN BLOOD BY AUTOMATED COUNT: 14.3 % (ref 11.5–14.5)
EST. GFR  (NO RACE VARIABLE): >60 ML/MIN/1.73 M^2
ETHANOL SERPL-MCNC: <3 MG/DL
GLUCOSE SERPL-MCNC: 88 MG/DL (ref 70–110)
GLUCOSE UR QL STRIP: NEGATIVE
HCT VFR BLD AUTO: 35.8 % (ref 37–48.5)
HGB BLD-MCNC: 11.6 G/DL (ref 12–16)
HGB UR QL STRIP: NEGATIVE
HYALINE CASTS #/AREA URNS LPF: 1.5 /LPF
IMM GRANULOCYTES # BLD AUTO: 0.35 K/UL (ref 0–0.04)
IMM GRANULOCYTES NFR BLD AUTO: 2.5 % (ref 0–0.5)
KETONES UR QL STRIP: NEGATIVE
LEUKOCYTE ESTERASE UR QL STRIP: ABNORMAL
LYMPHOCYTES # BLD AUTO: 3 K/UL (ref 1–4.8)
LYMPHOCYTES NFR BLD: 21.4 % (ref 18–48)
MCH RBC QN AUTO: 27.6 PG (ref 27–31)
MCHC RBC AUTO-ENTMCNC: 32.4 G/DL (ref 32–36)
MCV RBC AUTO: 85 FL (ref 82–98)
METHADONE UR QL SCN>300 NG/ML: NEGATIVE
MICROSCOPIC COMMENT: ABNORMAL
MONOCYTES # BLD AUTO: 0.6 K/UL (ref 0.3–1)
MONOCYTES NFR BLD: 3.9 % (ref 4–15)
NEUTROPHILS # BLD AUTO: 10.2 K/UL (ref 1.8–7.7)
NEUTROPHILS NFR BLD: 71.5 % (ref 38–73)
NITRITE UR QL STRIP: NEGATIVE
NRBC BLD-RTO: 0 /100 WBC
OPIATES UR QL SCN: ABNORMAL
PCP UR QL SCN>25 NG/ML: NEGATIVE
PH UR STRIP: 8 [PH] (ref 5–8)
PLATELET # BLD AUTO: 388 K/UL (ref 150–450)
PMV BLD AUTO: 9.9 FL (ref 9.2–12.9)
POTASSIUM SERPL-SCNC: 3.4 MMOL/L (ref 3.5–5.1)
PROT SERPL-MCNC: 7.2 G/DL (ref 6–8.4)
PROT UR QL STRIP: ABNORMAL
RBC # BLD AUTO: 4.21 M/UL (ref 4–5.4)
RBC #/AREA URNS HPF: 2 /HPF (ref 0–4)
SODIUM SERPL-SCNC: 140 MMOL/L (ref 136–145)
SP GR UR STRIP: 1.01 (ref 1–1.03)
SQUAMOUS #/AREA URNS HPF: 6 /HPF
TOXICOLOGY INFORMATION: ABNORMAL
TSH SERPL DL<=0.005 MIU/L-ACNC: 1.33 UIU/ML (ref 0.4–4)
URN SPEC COLLECT METH UR: ABNORMAL
UROBILINOGEN UR STRIP-ACNC: NEGATIVE EU/DL
WBC # BLD AUTO: 14.22 K/UL (ref 3.9–12.7)
WBC #/AREA URNS HPF: 10 /HPF (ref 0–5)

## 2023-12-10 PROCEDURE — 25000003 PHARM REV CODE 250: Performed by: PSYCHIATRY & NEUROLOGY

## 2023-12-10 PROCEDURE — 11400000 HC PSYCH PRIVATE ROOM

## 2023-12-10 PROCEDURE — 85025 COMPLETE CBC W/AUTO DIFF WBC: CPT | Performed by: EMERGENCY MEDICINE

## 2023-12-10 PROCEDURE — 80143 DRUG ASSAY ACETAMINOPHEN: CPT | Performed by: EMERGENCY MEDICINE

## 2023-12-10 PROCEDURE — 99205 PR OFFICE/OUTPT VISIT, NEW, LEVL V, 60-74 MIN: ICD-10-PCS | Mod: 95,AF,HA, | Performed by: PSYCHIATRY & NEUROLOGY

## 2023-12-10 PROCEDURE — 36415 COLL VENOUS BLD VENIPUNCTURE: CPT | Performed by: EMERGENCY MEDICINE

## 2023-12-10 PROCEDURE — 80053 COMPREHEN METABOLIC PANEL: CPT | Performed by: EMERGENCY MEDICINE

## 2023-12-10 PROCEDURE — 82077 ASSAY SPEC XCP UR&BREATH IA: CPT | Performed by: EMERGENCY MEDICINE

## 2023-12-10 PROCEDURE — 81000 URINALYSIS NONAUTO W/SCOPE: CPT | Mod: 59 | Performed by: EMERGENCY MEDICINE

## 2023-12-10 PROCEDURE — 80061 LIPID PANEL: CPT | Performed by: PSYCHIATRY & NEUROLOGY

## 2023-12-10 PROCEDURE — 80307 DRUG TEST PRSMV CHEM ANLYZR: CPT | Performed by: EMERGENCY MEDICINE

## 2023-12-10 PROCEDURE — 99285 EMERGENCY DEPT VISIT HI MDM: CPT

## 2023-12-10 PROCEDURE — 99205 OFFICE O/P NEW HI 60 MIN: CPT | Mod: 95,AF,HA, | Performed by: PSYCHIATRY & NEUROLOGY

## 2023-12-10 PROCEDURE — 83036 HEMOGLOBIN GLYCOSYLATED A1C: CPT | Performed by: PSYCHIATRY & NEUROLOGY

## 2023-12-10 PROCEDURE — 84443 ASSAY THYROID STIM HORMONE: CPT | Performed by: EMERGENCY MEDICINE

## 2023-12-10 PROCEDURE — 81025 URINE PREGNANCY TEST: CPT | Performed by: EMERGENCY MEDICINE

## 2023-12-10 RX ORDER — IBUPROFEN 200 MG
1 TABLET ORAL DAILY PRN
Status: DISCONTINUED | OUTPATIENT
Start: 2023-12-10 | End: 2023-12-13 | Stop reason: HOSPADM

## 2023-12-10 RX ORDER — BENZTROPINE MESYLATE 1 MG/ML
2 INJECTION, SOLUTION INTRAMUSCULAR; INTRAVENOUS EVERY 8 HOURS PRN
Status: DISCONTINUED | OUTPATIENT
Start: 2023-12-10 | End: 2023-12-13 | Stop reason: HOSPADM

## 2023-12-10 RX ORDER — MAG HYDROX/ALUMINUM HYD/SIMETH 200-200-20
30 SUSPENSION, ORAL (FINAL DOSE FORM) ORAL EVERY 6 HOURS PRN
Status: DISCONTINUED | OUTPATIENT
Start: 2023-12-10 | End: 2023-12-13 | Stop reason: HOSPADM

## 2023-12-10 RX ORDER — OLANZAPINE 10 MG/1
10 TABLET ORAL EVERY 8 HOURS PRN
Status: DISCONTINUED | OUTPATIENT
Start: 2023-12-10 | End: 2023-12-13 | Stop reason: HOSPADM

## 2023-12-10 RX ORDER — HYDROXYZINE PAMOATE 50 MG/1
50 CAPSULE ORAL EVERY 6 HOURS PRN
Status: DISCONTINUED | OUTPATIENT
Start: 2023-12-10 | End: 2023-12-13 | Stop reason: HOSPADM

## 2023-12-10 RX ORDER — LOPERAMIDE HYDROCHLORIDE 2 MG/1
2 CAPSULE ORAL
Status: DISCONTINUED | OUTPATIENT
Start: 2023-12-10 | End: 2023-12-13 | Stop reason: HOSPADM

## 2023-12-10 RX ORDER — OLANZAPINE 10 MG/2ML
10 INJECTION, POWDER, FOR SOLUTION INTRAMUSCULAR EVERY 8 HOURS PRN
Status: DISCONTINUED | OUTPATIENT
Start: 2023-12-10 | End: 2023-12-13 | Stop reason: HOSPADM

## 2023-12-10 RX ORDER — PROMETHAZINE HYDROCHLORIDE 25 MG/1
25 TABLET ORAL EVERY 6 HOURS PRN
Status: DISCONTINUED | OUTPATIENT
Start: 2023-12-10 | End: 2023-12-13 | Stop reason: HOSPADM

## 2023-12-10 RX ORDER — ACETAMINOPHEN 325 MG/1
650 TABLET ORAL EVERY 6 HOURS PRN
Status: DISCONTINUED | OUTPATIENT
Start: 2023-12-10 | End: 2023-12-13 | Stop reason: HOSPADM

## 2023-12-10 RX ORDER — FAMOTIDINE 20 MG/1
20 TABLET, FILM COATED ORAL 2 TIMES DAILY
Status: DISCONTINUED | OUTPATIENT
Start: 2023-12-10 | End: 2023-12-13 | Stop reason: HOSPADM

## 2023-12-10 RX ORDER — TALC
6 POWDER (GRAM) TOPICAL NIGHTLY PRN
Status: DISCONTINUED | OUTPATIENT
Start: 2023-12-10 | End: 2023-12-13 | Stop reason: HOSPADM

## 2023-12-10 RX ORDER — ONDANSETRON 4 MG/1
4 TABLET, ORALLY DISINTEGRATING ORAL EVERY 8 HOURS PRN
Status: DISCONTINUED | OUTPATIENT
Start: 2023-12-10 | End: 2023-12-13 | Stop reason: HOSPADM

## 2023-12-10 RX ORDER — ONDANSETRON 4 MG/1
8 TABLET, ORALLY DISINTEGRATING ORAL EVERY 8 HOURS PRN
Status: DISCONTINUED | OUTPATIENT
Start: 2023-12-10 | End: 2023-12-10 | Stop reason: SDUPTHER

## 2023-12-10 RX ORDER — BENZONATATE 100 MG/1
100 CAPSULE ORAL 3 TIMES DAILY PRN
Status: DISCONTINUED | OUTPATIENT
Start: 2023-12-10 | End: 2023-12-13 | Stop reason: HOSPADM

## 2023-12-10 RX ORDER — ACETAMINOPHEN 325 MG/1
650 TABLET ORAL EVERY 6 HOURS PRN
Status: DISCONTINUED | OUTPATIENT
Start: 2023-12-10 | End: 2023-12-10

## 2023-12-10 RX ORDER — ACETAMINOPHEN 325 MG/1
650 TABLET ORAL EVERY 8 HOURS PRN
Status: DISCONTINUED | OUTPATIENT
Start: 2023-12-10 | End: 2023-12-10 | Stop reason: SDUPTHER

## 2023-12-10 RX ADMIN — ACETAMINOPHEN 650 MG: 325 TABLET ORAL at 06:12

## 2023-12-10 NOTE — ED PROVIDER NOTES
"Encounter Date: 12/10/2023       History     Chief Complaint   Patient presents with    Psychiatric Evaluation     Pt to ED via [facility] for psych eval. Patient denies SI/HI. Recent pregnancy. States she was angry with boyfriend and said stupid things she doesn't mean.      18-year-old female presents the emergency department with the police, brought her because she made some suicidal statements to her significant other.  Patient states she is not suicidal, we made a statement because she was angry with him.  They were arguing at home, and states she "made a stupid statement".  Denies HI, denies SI.    Patient allegedly fought with boyfriend and mother over an opiate bottle stating she was going to overdose.  Confirmed by the boyfriend.      Review of patient's allergies indicates:  No Known Allergies  Past Medical History:   Diagnosis Date    COVID     HX OF    Hyperemesis gravidarum      Past Surgical History:   Procedure Laterality Date    HERNIA REPAIR      SALPINGOOPHORECTOMY Left 8/16/2022    Procedure: SALPINGO-OOPHORECTOMY;  Surgeon: John Mcbride MD;  Location: [facility];  Service: OB/GYN;  Laterality: Left;  unilateral     TONSILLECTOMY       Family History   Problem Relation Age of Onset    No Known Problems Mother     No Known Problems Father      Social History     Tobacco Use    Smoking status: Former     Types: Vaping with nicotine    Smokeless tobacco: Never   Substance Use Topics    Alcohol use: Not Currently    Drug use: Not Currently     Review of Systems   Constitutional:  Negative for fever.   HENT:  Negative for sore throat.    Respiratory:  Negative for shortness of breath.    Cardiovascular:  Negative for chest pain.   Gastrointestinal:  Negative for nausea.   Genitourinary:  Negative for dysuria.   Musculoskeletal:  Negative for back pain.   Skin:  Negative for rash.   Neurological:  Negative for weakness.   Hematological:  Does not bruise/bleed easily.   All other systems reviewed and are " negative.      Physical Exam     Initial Vitals [12/10/23 1528]   BP Pulse Resp Temp SpO2   130/62 (!) 114 18 98 °F (36.7 °C) 99 %      MAP       --         Physical Exam    Nursing note and vitals reviewed.  Constitutional: She appears well-developed and well-nourished. She is not diaphoretic. No distress.   HENT:   Head: Normocephalic and atraumatic.   Eyes: Conjunctivae and EOM are normal. Pupils are equal, round, and reactive to light.   Neck: Neck supple.   Normal range of motion.  Cardiovascular:  Normal rate, regular rhythm, normal heart sounds and intact distal pulses.           No murmur heard.  Pulmonary/Chest: Breath sounds normal. No respiratory distress. She has no wheezes. She has no rhonchi. She has no rales. She exhibits no tenderness.   Abdominal: Abdomen is soft. Bowel sounds are normal.   Musculoskeletal:         General: No tenderness or edema. Normal range of motion.      Cervical back: Normal range of motion and neck supple.     Neurological: She is alert and oriented to person, place, and time. She has normal strength. No cranial nerve deficit. GCS score is 15. GCS eye subscore is 4. GCS verbal subscore is 5. GCS motor subscore is 6.   Skin: Skin is warm and dry. Capillary refill takes less than 2 seconds.   Psychiatric: Her speech is normal and behavior is normal. Her mood appears anxious. She is not actively hallucinating. Thought content is not paranoid and not delusional. Cognition and memory are normal. She expresses no homicidal and no suicidal ideation. She expresses no suicidal plans and no homicidal plans. She is attentive.         ED Course   Procedures  Labs Reviewed   CBC W/ AUTO DIFFERENTIAL - Abnormal; Notable for the following components:       Result Value    WBC 14.22 (*)     Hemoglobin 11.6 (*)     Hematocrit 35.8 (*)     Immature Granulocytes 2.5 (*)     Gran # (ANC) 10.2 (*)     Immature Grans (Abs) 0.35 (*)     Mono % 3.9 (*)     All other components within normal limits    COMPREHENSIVE METABOLIC PANEL - Abnormal; Notable for the following components:    Potassium 3.4 (*)     Calcium 8.6 (*)     Alkaline Phosphatase 44 (*)     All other components within normal limits   URINALYSIS, REFLEX TO URINE CULTURE - Abnormal; Notable for the following components:    Protein, UA 1+ (*)     Leukocytes, UA Trace (*)     All other components within normal limits    Narrative:     Preferred Collection Type->Urine, Clean Catch  Specimen Source->Urine   DRUG SCREEN PANEL, URINE EMERGENCY - Abnormal; Notable for the following components:    Benzodiazepines Presumptive Positive (*)     Opiate Scrn, Ur Presumptive Positive (*)     THC Presumptive Positive (*)     All other components within normal limits    Narrative:     Preferred Collection Type->Urine, Clean Catch  Specimen Source->Urine   ACETAMINOPHEN LEVEL - Abnormal; Notable for the following components:    Acetaminophen (Tylenol), Serum <2.0 (*)     All other components within normal limits   URINALYSIS MICROSCOPIC - Abnormal; Notable for the following components:    WBC, UA 10 (*)     Hyaline Casts, UA 1.5 (*)     All other components within normal limits    Narrative:     Preferred Collection Type->Urine, Clean Catch  Specimen Source->Urine   TSH   ALCOHOL,MEDICAL (ETHANOL)   PREGNANCY TEST, URINE RAPID    Narrative:     Specimen Source->Urine          Imaging Results    None          Medications - No data to display  Medical Decision Making  Amount and/or Complexity of Data Reviewed  Labs: ordered.    Risk  Decision regarding hospitalization.               ED Course as of 12/10/23 1701   Sun Dec 10, 2023   1659 Discussed case with Dr. Quintero, agrees to admission for acute psychiatric care and treatment [SD]      ED Course User Index  [SD] Bigg Joseph MD               Medical Decision Making:   Differential Diagnosis:   Domestic issue, depression, suicidal statement  ED Management:  Tele psych performed, recommends [facility] for suicidal  statement and threat.  Also because she is in the postpartum period, and no outpatient provider follow up             Clinical Impression:  Final diagnoses:  [R45.851] Suicidal ideation (Primary)  [Z00.8] Medical clearance for psychiatric admission          ED Disposition Condition    Admit Stable                Bigg Joseph MD  12/10/23 1650       Bigg Joseph MD  12/10/23 1701

## 2023-12-10 NOTE — CONSULTS
[facility]  Psychiatry  Telepsychiatry Consult Note    Please see previous notes:    Patient agreeable to consultation via telepsychiatry.    Tele-Consultation from Psychiatry started: 12/10/2023 at [id number]  The chief complaint leading to psychiatric consultation is: suicidality  This consultation was requested by Dr. Joseph, the Emergency Department attending physician.  The location of the consulting psychiatrist is  [facility] .  The patient location is  [facility] EMERGENCY DEPARTMENT   The patient arrived at the ED at: this afternoon    Also present with the patient at the time of the consultation: seen alone    Patient Identification:   Lilly Lockwood is a 18 y.o. female.    Patient information was obtained from patient, parent, and past medical records.  Patient presented involuntarily to the Emergency Department      Consults  Teleconsult Time Documentation  Subjective:     History of Present Illness:  The patient is a 18 year old woman who denies any past psychiatric history to me who is 3 months postpartum sent in by police after her family called 911 because of suicidal statements and or behavior today. The patient essentially denies that she is suicidal and or needs hospitalization. She does state that she noticed she is more irritable for the past couple of months. She noticed that she is easily triggered. She admits that she can lose her temper at times. She attributes this to her boyfriend lacking empathy for her situation as a new mother whose body has gone through significant changes this year. She states that he often ignores her or antagonizes her and therefore triggers her anger outbursts. She admits to having made a number of statements today and says she can't remember all of them and may have said that she wanted to kill herself but denies that she actually did. She says that they had an argument about a trivial matter and she left the house to take the baby to her mother in  law's. She says her boyfriend then called her to demand she returned the car since it's in his name. She says she went and took the car back and tried to talk to him but he wouldn't listen to her and she became angry and then made the statements. She says that he recorded her and subsequently paid played the recording for her mother who is the person who called the police. She denies any history of depression anxiety past counseling past hospitalization past suicide attempts or past medication trials. She states that she's getting 11:50 hours of sleep per night because her three month old is sleeping well per night overnight. She denies any problems with energy level appetite concentration or self esteem. She states it is good to be alive. No psychotic symptoms. I asked her what her boyfriend and mother were likely to say when I called them for collateral and she replied that they would say she is crazy however she's unable to elaborate. New paragraph I called her mother April. Her mother states that the patient has been easily triggered and overly emotional for three to four years period she describes her daughter as manipulative often throwing what she calls adult style temper tantrums when she doesn't get her way etc. She attributes some of this to her daughter using marijuana. She states there was in fact a hospitalization when she was 12. She says she caught her daughter using cannabis and the patient stated she was suicidal. However once she got to the hospital she stated that it was just a joke and she had just said it to get away from her mom because she was afraid she would be severely punished. Mom does describe an escalation in the intensity and frequency of the outbursts over the past one to two months with expressions of a desire to die now several times per week. She's concerned because of the new baby at home. She states that the patient doesn't seem to be very connected to her grandson. She states  "that she is trying to get babysitters as much as possible and not spending very much time with him. She further states that the boyfriend Shaheen told her that he and or the police had to wrestle pain pills away from the patient today in order to prevent her from overdosing in a suicide attempt. I had called the boyfriend but was unable to reach him and so I don't have any first hand confirmation of this information.   April says that Shaheen has been sending her recording of the patient's outbursts more than weekly in recent weeks out of concern that the patient is worsening/decompensating.      ADDENDUM - reached Shaheen who confirmed the wrestling away of the pill bottle (she had her wisdom teeth out last week - opioids and antinausea medication plus antibiotics) this afternoon but states the patient is a good mom to their baby and no concerns there    Psychiatric History:   Previous Psychiatric Hospitalizations: Yes    Previous Medication Trials: No    Previous Suicide Attempts: no    History of Violence: throws objects  History of Depression: no  History of Annette: no  History of Auditory/Visual Hallucination no  History of Delusions: no  Outpatient psychiatrist (current & past): No    Substance Abuse History:  Tobacco:No  Alcohol: No  Illicit Substances:cannabis  Detox/Rehab: No    Legal History: Past charges/incarcerations: No     Family Psychiatric History: unknown      Social History:   HS graduate.  Lives with boyfrienjameson Jamison and their 3 month old son.  Stay at home mom.      Psychiatric Mental Status Exam:  Arousal: alert  Sensorium/Orientation: oriented to grossly intact  Behavior/Cooperation: normal, cooperative   Speech: normal tone, normal rate, normal pitch, normal volume  Language: grossly intact  Mood: " im wang "   Affect: appropriate  Thought Process: normal and logical  Thought Content:   Auditory hallucinations: NO  Visual hallucinations: NO  Paranoia: NO  Delusions:  NO  Suicidal ideation: YES: " denies now but earlier today yes     Homicidal ideation: NO  Attention/Concentration:  intact  Memory:    Recent:  Intact   Remote: Intact   3/3 immediate,  /3 at 5 min  Fund of Knowledge: Aware of current events   Abstract reasoning:   Insight: poor awareness of illness  Judgment: limited      Past Medical History:   Past Medical History:   Diagnosis Date    COVID     HX OF    Hyperemesis gravidarum       Laboratory Data:   Labs Reviewed   CBC W/ AUTO DIFFERENTIAL - Abnormal; Notable for the following components:       Result Value    WBC 14.22 (*)     Hemoglobin 11.6 (*)     Hematocrit 35.8 (*)     Immature Granulocytes 2.5 (*)     Gran # (ANC) 10.2 (*)     Immature Grans (Abs) 0.35 (*)     Mono % 3.9 (*)     All other components within normal limits   COMPREHENSIVE METABOLIC PANEL - Abnormal; Notable for the following components:    Potassium 3.4 (*)     Calcium 8.6 (*)     Alkaline Phosphatase 44 (*)     All other components within normal limits   URINALYSIS, REFLEX TO URINE CULTURE - Abnormal; Notable for the following components:    Protein, UA 1+ (*)     Leukocytes, UA Trace (*)     All other components within normal limits    Narrative:     Preferred Collection Type->Urine, Clean Catch  Specimen Source->Urine   DRUG SCREEN PANEL, URINE EMERGENCY - Abnormal; Notable for the following components:    Benzodiazepines Presumptive Positive (*)     Opiate Scrn, Ur Presumptive Positive (*)     THC Presumptive Positive (*)     All other components within normal limits    Narrative:     Preferred Collection Type->Urine, Clean Catch  Specimen Source->Urine   ACETAMINOPHEN LEVEL - Abnormal; Notable for the following components:    Acetaminophen (Tylenol), Serum <2.0 (*)     All other components within normal limits   URINALYSIS MICROSCOPIC - Abnormal; Notable for the following components:    WBC, UA 10 (*)     Hyaline Casts, UA 1.5 (*)     All other components within normal limits    Narrative:     Preferred Collection  Type->Urine, Clean Catch  Specimen Source->Urine   TSH   ALCOHOL,MEDICAL (ETHANOL)   PREGNANCY TEST, URINE RAPID    Narrative:     Specimen Source->Urine          Allergies:    Review of patient's allergies indicates:  No Known Allergies    Medications in ER: Medications - No data to display    Medications at home: none    No new subjective & objective note has been filed under this hospital service since the last note was generated.      Assessment - Diagnosis - Goals:     Diagnosis/Impression:   suspect underlying [facility]PD.  Worsening mood instability now and high risk for post partum depression.  No providers in the community and attempted to get a bottle of opioids today in apparent suicide attempt vs gesture.      Rec: [facility] for stabilization and treatment.       Time with patient: 40 min      More than 50% of the time was spent counseling/coordinating care    Consulting clinician was informed of the encounter and consult note.    Consultation ended: 12/10/2023 at 447 pm    Rosalinda Vickers MD   Psychiatry  [facility]

## 2023-12-11 PROBLEM — R45.851 DEPRESSION WITH SUICIDAL IDEATION: Status: ACTIVE | Noted: 2023-12-11

## 2023-12-11 PROBLEM — F12.10 TETRAHYDROCANNABINOL (THC) USE DISORDER, MILD, ABUSE: Status: RESOLVED | Noted: 2023-12-11 | Resolved: 2023-12-11

## 2023-12-11 PROBLEM — F12.10 TETRAHYDROCANNABINOL (THC) USE DISORDER, MILD, ABUSE: Status: ACTIVE | Noted: 2023-12-11

## 2023-12-11 PROBLEM — F19.10 POLYSUBSTANCE ABUSE: Status: ACTIVE | Noted: 2023-12-11

## 2023-12-11 PROBLEM — F32.A DEPRESSION WITH SUICIDAL IDEATION: Status: ACTIVE | Noted: 2023-12-11

## 2023-12-11 PROBLEM — R45.851 VERBALIZES SUICIDAL THOUGHTS: Status: ACTIVE | Noted: 2023-12-11

## 2023-12-11 LAB
CHOLEST SERPL-MCNC: 168 MG/DL (ref 120–199)
CHOLEST/HDLC SERPL: 4.1 {RATIO} (ref 2–5)
ESTIMATED AVG GLUCOSE: 103 MG/DL (ref 68–131)
HBA1C MFR BLD: 5.2 % (ref 4–5.6)
HDLC SERPL-MCNC: 41 MG/DL (ref 40–75)
HDLC SERPL: 24.4 % (ref 20–50)
LDLC SERPL CALC-MCNC: 100.8 MG/DL (ref 63–159)
NONHDLC SERPL-MCNC: 127 MG/DL
TRIGL SERPL-MCNC: 131 MG/DL (ref 30–150)

## 2023-12-11 PROCEDURE — 99222 PR INITIAL HOSPITAL CARE,LEVL II: ICD-10-PCS | Mod: AF,HA,, | Performed by: PSYCHIATRY & NEUROLOGY

## 2023-12-11 PROCEDURE — 90833 PSYTX W PT W E/M 30 MIN: CPT | Mod: AF,HA,, | Performed by: PSYCHIATRY & NEUROLOGY

## 2023-12-11 PROCEDURE — 25000003 PHARM REV CODE 250: Performed by: EMERGENCY MEDICINE

## 2023-12-11 PROCEDURE — 36415 COLL VENOUS BLD VENIPUNCTURE: CPT | Performed by: PSYCHIATRY & NEUROLOGY

## 2023-12-11 PROCEDURE — 25000003 PHARM REV CODE 250: Performed by: PSYCHIATRY & NEUROLOGY

## 2023-12-11 PROCEDURE — 11400000 HC PSYCH PRIVATE ROOM

## 2023-12-11 PROCEDURE — 99222 1ST HOSP IP/OBS MODERATE 55: CPT | Mod: AF,HA,, | Performed by: PSYCHIATRY & NEUROLOGY

## 2023-12-11 PROCEDURE — 25000003 PHARM REV CODE 250: Performed by: INTERNAL MEDICINE

## 2023-12-11 PROCEDURE — 90833 PR PSYCHOTHERAPY W/PATIENT W/E&M, 30 MIN (ADD ON): ICD-10-PCS | Mod: AF,HA,, | Performed by: PSYCHIATRY & NEUROLOGY

## 2023-12-11 RX ORDER — PENICILLIN V POTASSIUM 250 MG/1
500 TABLET, FILM COATED ORAL 4 TIMES DAILY
Status: DISCONTINUED | OUTPATIENT
Start: 2023-12-11 | End: 2023-12-13 | Stop reason: HOSPADM

## 2023-12-11 RX ADMIN — FAMOTIDINE 20 MG: 20 TABLET, FILM COATED ORAL at 08:12

## 2023-12-11 RX ADMIN — PENICILLIN V POTASSIUM 500 MG: 250 TABLET, FILM COATED ORAL at 08:12

## 2023-12-11 RX ADMIN — PENICILLIN V POTASSIUM 500 MG: 250 TABLET, FILM COATED ORAL at 04:12

## 2023-12-11 NOTE — SUBJECTIVE & OBJECTIVE
Past Medical History:   Diagnosis Date    COVID     HX OF    Hyperemesis gravidarum        Past Surgical History:   Procedure Laterality Date    HERNIA REPAIR      SALPINGOOPHORECTOMY Left 8/16/2022    Procedure: SALPINGO-OOPHORECTOMY;  Surgeon: John Mcbride MD;  Location: [facility];  Service: OB/GYN;  Laterality: Left;  unilateral     TONSILLECTOMY         Review of patient's allergies indicates:  No Known Allergies    No current facility-administered medications on file prior to encounter.     Current Outpatient Medications on File Prior to Encounter   Medication Sig    PNV,calcium 72/iron/folic acid (PRENATAL VITAMIN) Tab Take 2 tablets by mouth once daily. TAKES 2 GUMMIES DAILY    tamsulosin (FLOMAX) 0.4 mg Cap Take 1 capsule (0.4 mg total) by mouth once daily. for 10 days     Family History       Problem Relation (Age of Onset)    No Known Problems Mother, Father          Tobacco Use    Smoking status: Former     Types: Vaping with nicotine    Smokeless tobacco: Never   Substance and Sexual Activity    Alcohol use: Not Currently    Drug use: Not Currently    Sexual activity: Not Currently     Review of Systems   Constitutional:  Negative for chills and fever.   HENT:  Negative for rhinorrhea, sneezing and sore throat.    Eyes:  Negative for pain and visual disturbance.   Respiratory:  Negative for cough and shortness of breath.    Cardiovascular:  Negative for chest pain.   Gastrointestinal:  Negative for abdominal pain, constipation and diarrhea.   Endocrine: Negative for cold intolerance and heat intolerance.   Genitourinary:  Negative for difficulty urinating.   Musculoskeletal:  Negative for arthralgias and joint swelling.   Skin:  Negative for rash.   Allergic/Immunologic: Negative for environmental allergies.   Neurological:  Negative for dizziness, syncope and weakness.   Hematological:  Does not bruise/bleed easily.   Psychiatric/Behavioral:  Negative for dysphoric mood. The patient is not  nervous/anxious.      Objective:     Vital Signs (Most Recent):  Temp: 98.9 °F (37.2 °C) (12/11/23 0748)  Pulse: 103 (12/11/23 0748)  Resp: 18 (12/11/23 0748)  BP: 130/83 (12/11/23 0748)  SpO2: 100 % (12/11/23 0748) Vital Signs (24h Range):  Temp:  [97.8 °F (36.6 °C)-99.2 °F (37.3 °C)] 98.9 °F (37.2 °C)  Pulse:  [] 103  Resp:  [16-20] 18  SpO2:  [99 %-100 %] 100 %  BP: (109-130)/(59-83) 130/83     Weight: 54.4 kg (120 lb)  Body mass index is 24.24 kg/m².     Physical Exam  Vitals and nursing note reviewed.   Constitutional:       Appearance: Normal appearance.   HENT:      Head: Normocephalic and atraumatic.      Nose: Nose normal.      Mouth/Throat:      Mouth: Mucous membranes are moist.      Pharynx: Oropharynx is clear.   Eyes:      Extraocular Movements: Extraocular movements intact.      Conjunctiva/sclera: Conjunctivae normal.      Pupils: Pupils are equal, round, and reactive to light.   Cardiovascular:      Rate and Rhythm: Normal rate and regular rhythm.      Pulses: Normal pulses.      Heart sounds: Normal heart sounds.   Pulmonary:      Effort: Pulmonary effort is normal.      Breath sounds: Normal breath sounds.   Abdominal:      General: Abdomen is flat. Bowel sounds are normal.      Palpations: Abdomen is soft.   Musculoskeletal:         General: Normal range of motion.      Cervical back: Normal range of motion and neck supple.   Skin:     General: Skin is warm and dry.      Capillary Refill: Capillary refill takes less than 2 seconds.      Comments: No rashes on limited skin exam.   Neurological:      General: No focal deficit present.      Mental Status: She is alert and oriented to person, place, and time.      Cranial Nerves: No cranial nerve deficit.      Comments: I Olfactory:  Sense of smell intact    II Optic:  Pupils equal round react to light.  Vision intact.    III, IV, VI, Ocular motor, Trochlear, Abducens:  Extraocular movements intact    V Trigeminal:  Facial sensation intact  facial sensation intact,, muscles of mastication intact muscles of mastication intact, corneal reflex intact, corneal reflex intact    VII Facial:  Muscles of facial expression intact     VIII Vestibular cochlear: Hearing intact vestibular cochlear: Hearing intact    IX Glossopharyngeal:  Gag reflex intact.  Tasting intact.     X Vagus:  Gag reflex intact.    XI Spinal Accessory:  Shoulder shrug intact.  Head rotation intact.    XII Hypoglossal:  Tongue movements intact.     Psychiatric:         Mood and Affect: Mood is anxious.      Comments: Patient appears depressed              CRANIAL NERVES     CN III, IV, VI   Pupils are equal, round, and reactive to light.       Significant Labs: All pertinent labs within the past 24 hours have been reviewed.    Significant Imaging: I have reviewed all pertinent imaging results/findings within the past 24 hours.

## 2023-12-11 NOTE — HPI
"  Encounter Date: 12/10/2023        History           Chief Complaint   Patient presents with    Psychiatric Evaluation       Pt to ED via [facility] for psych eval. Patient denies SI/HI. Recent pregnancy. States she was angry with boyfriend and said stupid things she doesn't mean.       18-year-old female presents the emergency department with the police, brought her because she made some suicidal statements to her significant other.  Patient states she is not suicidal, we made a statement because she was angry with him.  They were arguing at home, and states she "made a stupid statement".  Denies HI, denies SI.     Patient allegedly fought with boyfriend and mother over an opiate bottle stating she was going to overdose.  Confirmed by the boyfriend.        Review of patient's allergies indicates:  No Known Allergies       Past Medical History:   Diagnosis Date    COVID       HX OF    Hyperemesis gravidarum              Past Surgical History:   Procedure Laterality Date    HERNIA REPAIR        SALPINGOOPHORECTOMY Left 8/16/2022     Procedure: SALPINGO-OOPHORECTOMY;  Surgeon: John Mcbride MD;  Location: [facility];  Service: OB/GYN;  Laterality: Left;  unilateral     TONSILLECTOMY                Family History   Problem Relation Age of Onset    No Known Problems Mother      No Known Problems Father              "

## 2023-12-11 NOTE — PLAN OF CARE
"Lilly Lockwood admitted to [facility] under the care of Rhett Quintero MD with diagnosis of depression with suicidal ideations. [facility] Patient is a 17yo female who presented to [facility] ER with the police.    Patient depressed, calm, cooperative, anxious, and irritable. Denies Suicidal Ideation, Homicidal Ideation, Auditory Hallucinations, Visual Hallucinations, Tactile Hallucinations, Gustatory Hallucinations, and Delusions.  19 yo female presents to the [facility] ER with the police, brought her because she made some suicidal statements to her significant other. Patient was fighting over a bottle of opiates threathening to overdose. Patient states she is not suicidal, I made a statement because I was angry with him. They were arguing at home and states she "made a stupid statement. Patient UDS was positive for benzos, opiates, and THC. Patient allegedly fought with the boyfriend and mother over an opiate bottle stating she wa sgoing to overdose.     Patient assessed and oriented to room unit and routine. Patient denies pain or discomfort at present and remains injury-free.     DAVEY GRACIA RN    "

## 2023-12-11 NOTE — H&P
"  [facility] Hospital Medicine  History & Physical    Patient Name: Lilly Lockwood  MRN: [id number]  Patient Class: IP- Psych  Admission Date: 12/10/2023  Attending Physician: Rhett Quintero MD   Primary Care Provider: [facility]         Patient information was obtained from ER records.     Subjective:     Principal Problem:<principal problem not specified>    Chief Complaint:   Chief Complaint   Patient presents with    Psychiatric Evaluation     Pt to ED via [facility] for psych eval. Patient denies SI/HI. Recent pregnancy. States she was angry with boyfriend and said stupid things she doesn't mean.         HPI:     Encounter Date: 12/10/2023        History           Chief Complaint   Patient presents with    Psychiatric Evaluation       Pt to ED via [facility] for psych eval. Patient denies SI/HI. Recent pregnancy. States she was angry with boyfriend and said stupid things she doesn't mean.       18-year-old female presents the emergency department with the police, brought her because she made some suicidal statements to her significant other.  Patient states she is not suicidal, we made a statement because she was angry with him.  They were arguing at home, and states she "made a stupid statement".  Denies HI, denies SI.     Patient allegedly fought with boyfriend and mother over an opiate bottle stating she was going to overdose.  Confirmed by the boyfriend.        Review of patient's allergies indicates:  No Known Allergies       Past Medical History:   Diagnosis Date    COVID       HX OF    Hyperemesis gravidarum              Past Surgical History:   Procedure Laterality Date    HERNIA REPAIR        SALPINGOOPHORECTOMY Left 8/16/2022     Procedure: SALPINGO-OOPHORECTOMY;  Surgeon: John Mcbride MD;  Location: [facility];  Service: OB/GYN;  Laterality: Left;  unilateral     TONSILLECTOMY                Family History   Problem Relation Age of Onset    No Known Problems " Mother      No Known Problems Father                Past Medical History:   Diagnosis Date    COVID     HX OF    Hyperemesis gravidarum        Past Surgical History:   Procedure Laterality Date    HERNIA REPAIR      SALPINGOOPHORECTOMY Left 8/16/2022    Procedure: SALPINGO-OOPHORECTOMY;  Surgeon: John Mcbride MD;  Location: [facility];  Service: OB/GYN;  Laterality: Left;  unilateral     TONSILLECTOMY         Review of patient's allergies indicates:  No Known Allergies    No current facility-administered medications on file prior to encounter.     Current Outpatient Medications on File Prior to Encounter   Medication Sig    PNV,calcium 72/iron/folic acid (PRENATAL VITAMIN) Tab Take 2 tablets by mouth once daily. TAKES 2 GUMMIES DAILY    tamsulosin (FLOMAX) 0.4 mg Cap Take 1 capsule (0.4 mg total) by mouth once daily. for 10 days     Family History       Problem Relation (Age of Onset)    No Known Problems Mother, Father          Tobacco Use    Smoking status: Former     Types: Vaping with nicotine    Smokeless tobacco: Never   Substance and Sexual Activity    Alcohol use: Not Currently    Drug use: Not Currently    Sexual activity: Not Currently     Review of Systems   Constitutional:  Negative for chills and fever.   HENT:  Negative for rhinorrhea, sneezing and sore throat.    Eyes:  Negative for pain and visual disturbance.   Respiratory:  Negative for cough and shortness of breath.    Cardiovascular:  Negative for chest pain.   Gastrointestinal:  Negative for abdominal pain, constipation and diarrhea.   Endocrine: Negative for cold intolerance and heat intolerance.   Genitourinary:  Negative for difficulty urinating.   Musculoskeletal:  Negative for arthralgias and joint swelling.   Skin:  Negative for rash.   Allergic/Immunologic: Negative for environmental allergies.   Neurological:  Negative for dizziness, syncope and weakness.   Hematological:  Does not bruise/bleed easily.   Psychiatric/Behavioral:   Negative for dysphoric mood. The patient is not nervous/anxious.      Objective:     Vital Signs (Most Recent):  Temp: 98.9 °F (37.2 °C) (12/11/23 0748)  Pulse: 103 (12/11/23 0748)  Resp: 18 (12/11/23 0748)  BP: 130/83 (12/11/23 0748)  SpO2: 100 % (12/11/23 0748) Vital Signs (24h Range):  Temp:  [97.8 °F (36.6 °C)-99.2 °F (37.3 °C)] 98.9 °F (37.2 °C)  Pulse:  [] 103  Resp:  [16-20] 18  SpO2:  [99 %-100 %] 100 %  BP: (109-130)/(59-83) 130/83     Weight: 54.4 kg (120 lb)  Body mass index is 24.24 kg/m².     Physical Exam  Vitals and nursing note reviewed.   Constitutional:       Appearance: Normal appearance.   HENT:      Head: Normocephalic and atraumatic.      Nose: Nose normal.      Mouth/Throat:      Mouth: Mucous membranes are moist.      Pharynx: Oropharynx is clear.   Eyes:      Extraocular Movements: Extraocular movements intact.      Conjunctiva/sclera: Conjunctivae normal.      Pupils: Pupils are equal, round, and reactive to light.   Cardiovascular:      Rate and Rhythm: Normal rate and regular rhythm.      Pulses: Normal pulses.      Heart sounds: Normal heart sounds.   Pulmonary:      Effort: Pulmonary effort is normal.      Breath sounds: Normal breath sounds.   Abdominal:      General: Abdomen is flat. Bowel sounds are normal.      Palpations: Abdomen is soft.   Musculoskeletal:         General: Normal range of motion.      Cervical back: Normal range of motion and neck supple.   Skin:     General: Skin is warm and dry.      Capillary Refill: Capillary refill takes less than 2 seconds.      Comments: No rashes on limited skin exam.   Neurological:      General: No focal deficit present.      Mental Status: She is alert and oriented to person, place, and time.      Cranial Nerves: No cranial nerve deficit.      Comments: I Olfactory:  Sense of smell intact    II Optic:  Pupils equal round react to light.  Vision intact.    III, IV, VI, Ocular motor, Trochlear, Abducens:  Extraocular movements  intact    V Trigeminal:  Facial sensation intact facial sensation intact,, muscles of mastication intact muscles of mastication intact, corneal reflex intact, corneal reflex intact    VII Facial:  Muscles of facial expression intact     VIII Vestibular cochlear: Hearing intact vestibular cochlear: Hearing intact    IX Glossopharyngeal:  Gag reflex intact.  Tasting intact.     X Vagus:  Gag reflex intact.    XI Spinal Accessory:  Shoulder shrug intact.  Head rotation intact.    XII Hypoglossal:  Tongue movements intact.     Psychiatric:         Mood and Affect: Mood is anxious.      Comments: Patient appears depressed              CRANIAL NERVES     CN III, IV, VI   Pupils are equal, round, and reactive to light.       Significant Labs: All pertinent labs within the past 24 hours have been reviewed.    Significant Imaging: I have reviewed all pertinent imaging results/findings within the past 24 hours.  Assessment/Plan:     Polysubstance abuse  Recommend Cessation. Monitor for signs and symptoms of withdrawal.       Verbalizes suicidal thoughts  To be admitted to our inpatient psychiatric unit for further evaluation and management.         VTE Risk Mitigation (From admission, onward)           Ordered     IP VTE LOW RISK PATIENT  Once         12/10/23 2012                                    Issac Baker Jr, MD  Department of Hospital Medicine  [facility]

## 2023-12-11 NOTE — PROGRESS NOTES
[facility]  Adult Nutrition  Consult Note    SUMMARY     RD consulted for new admit. Spoke with RN. RN states that patient has no dietary issues at this time. RD to sign off. Please reconsult if any dietary/nutrition issues arise.

## 2023-12-11 NOTE — PLAN OF CARE
"Patient compliant with all medications.  Upon entering patient's room, found patient crying and tearful.  States, "I miss my baby, he is only 3 months old and he needs his Momma".  Offered emotional support and understanding.  Asking when she would be going home.  Offered emotional support and explained that her doctor would make that decision, but hopefully she would be out very soon.  Patient also stated, "I would never kill myself, I just said that because I was upset".   Offered support and reassurance, that hopefully she would be home soon.   Continue to monitor patient's behavior and safety.   Yulia Murphy LPN  "

## 2023-12-11 NOTE — PLAN OF CARE
POC reviewed this shift and is ongoing.  Pt cooperative with staff and interacts well with peers. Denies SI, HI, A/V hallucination.  No ss of distress.

## 2023-12-11 NOTE — H&P
"PSYCHIATRY INPATIENT ADMISSION NOTE - H & P      12/11/2023 9:00 AM   Lilly Lockwood   2005   [id number]         DATE OF ADMISSION: 12/10/2023  3:26 PM    SITE: [facility]    CURRENT LEGAL STATUS: PEC and/or CEC      HISTORY    CHIEF COMPLAINT   Lilly Lockwood is a 18 y.o. female with no past psychiatric history  currently admitted to the inpatient unit with the following chief complaint: depression/SI, "we had an argument and I got upset."    HPI   The patient was seen and examined. The chart was reviewed.    The patient presented to the ER on 12/10/2023 . Per staff notes:   -Pt to ED via [facility] for psych eval. Patient denies SI/HI. Recent pregnancy. States she was angry with boyfriend and said stupid things she doesn't mean   -18-year-old female presents the emergency department with the police, brought her because she made some suicidal statements to her significant other.  Patient states she is not suicidal, we made a statement because she was angry with him.  They were arguing at home, and states she "made a stupid statement".  Denies HI, denies SI.   Patient allegedly fought with boyfriend and mother over an opiate bottle stating she was going to overdose.  Confirmed by the boyfriend  -The patient is a 18 year old woman who denies any past psychiatric history to me who is 3 months postpartum sent in by police after her family called 911 because of suicidal statements and or behavior today. The patient essentially denies that she is suicidal and or needs hospitalization. She does state that she noticed she is more irritable for the past couple of months. She noticed that she is easily triggered. She admits that she can lose her temper at times. She attributes this to her boyfriend lacking empathy for her situation as a new mother whose body has gone through significant changes this year. She states that he often ignores her or antagonizes her and therefore triggers her anger outbursts. " She admits to having made a number of statements today and says she can't remember all of them and may have said that she wanted to kill herself but denies that she actually did. She says that they had an argument about a trivial matter and she left the house to take the baby to her mother in law's. She says her boyfriend then called her to demand she returned the car since it's in his name. She says she went and took the car back and tried to talk to him but he wouldn't listen to her and she became angry and then made the statements. She says that he recorded her and subsequently paid played the recording for her mother who is the person who called the police. She denies any history of depression anxiety past counseling past hospitalization past suicide attempts or past medication trials. She states that she's getting 11:50 hours of sleep per night because her three month old is sleeping well per night overnight. She denies any problems with energy level appetite concentration or self esteem. She states it is good to be alive. No psychotic symptoms. I asked her what her boyfriend and mother were likely to say when I called them for collateral and she replied that they would say she is crazy however she's unable to elaborate. New paragraph I called her mother April. Her mother states that the patient has been easily triggered and overly emotional for three to four years period she describes her daughter as manipulative often throwing what she calls adult style temper tantrums when she doesn't get her way etc. She attributes some of this to her daughter using marijuana. She states there was in fact a hospitalization when she was 12. She says she caught her daughter using cannabis and the patient stated she was suicidal. However once she got to the hospital she stated that it was just a joke and she had just said it to get away from her mom because she was afraid she would be severely punished. Mom does describe an  "escalation in the intensity and frequency of the outbursts over the past one to two months with expressions of a desire to die now several times per week. She's concerned because of the new baby at home. She states that the patient doesn't seem to be very connected to her grandson. She states that she is trying to get babysitters as much as possible and not spending very much time with him. She further states that the boyfriend Shaheen told her that he and or the police had to wrestle pain pills away from the patient today in order to prevent her from overdosing in a suicide attempt. I had called the boyfriend but was unable to reach him and so I don't have any first hand confirmation of this information.   April says that Shaheen has been sending her recording of the patient's outbursts more than weekly in recent weeks out of concern that the patient is worsening/decompensating.     ADDENDUM - reached Shaheen who confirmed the wrestling away of the pill bottle (she had her wisdom teeth out last week - opioids and antinausea medication plus antibiotics) this afternoon but states the patient is a good mom to their baby and no concerns there      The patient was medically cleared and admitted to the U.    The patient reports that she had an argument with her boyfriend over "a hoodie." The argument escalated and she said "something I didn't mean." She discussed her relationship in detail. "He picks and picks."  "I just wanted a reaction from him.    She denied threatening to overdose or having pills in her hand to overdose; she maintains that she had the medications to take what was prescribed.     She has a 3 month old; she noted some post partum blues, but denied post partum depression    She denied all symptoms as documented below. She does not think that she needs to be in the hospital.       Symptoms of Depression: diminished mood - No, loss of interest/anhedonia - No;  recurrent - No, >14 days - No, diminished " "energy - No, change in sleep - No, change in appetite - No, diminished concentration or cognition or indecisiveness - No, PMA/R -  No, excessive guilt or hopelessness or worthlessness - No, suicidal ideations - No    Changes in Sleep: trouble with initiation- No, maintenance, - No early morning awakening with inability to return to sleep - No, hypersomnolence - No    Suicidal- active/passive ideations - No, organized plans, future intentions - No    Homicidal ideations: active/passive ideations - No, organized plans, future intentions - No    Symptoms of psychosis: hallucinations - No, delusions - No, disorganized speech - No, disorganized behavior or abnormal motor behavior - No, or negative symptoms (diminshed emotional expression, avolition, anhedonia, alogia, asociality) - No, active phase symptoms >1 month - No, continuous signs of illness > 6 months - No, since onset of illness decreased level of functioning present - No    Symptoms of chi or hypomania: elevated, expansive, or irritable mood with increased energy or activity - No; > 4 days - No,  >7 days - No; with inflated self-esteem or grandiosity - No, decreased need for sleep - No, increased rate of speech - No, FOI or racing thoughts - No, distractibility - No, increased goal directed activity or PMA - No, risky/disinhibited behavior - No    Symptoms of FILOMENA: excessive anxiety/worry/fear, more days than not, about numerous issues - No, ongoing for >6 months - No, difficult to control - No, with restlessness - No, fatigue - No, poor concentration - No, irritability - No, muscle tension - No, sleep disturbance - No; causes functionally impairing distress - No    Symptoms of Panic Disorder: recurrent panic attacks (palpitations/heart racing, sweating, shakiness, dyspnea, choking, chest pain/discomfort, Gi symptoms, dizzy/lightheadedness, hot/col flashes, paresthesias, derealization, fear of losing control or fear of dying or fear of "going crazy") - No, " precipitated - No, un-precipitated - No, source of worry and/or behavioral changes secondary for 1 month or longer- No, agoraphobia - No    Symptoms of PTSD: h/o trauma exposure - No; re-experiencing/intrusive symptoms - No, avoidant behavior - No, 2 or more negative alterations in cognition or mood - No, 2 or more hyperarousal symptoms - No; with dissociative symptoms - No, ongoing for 1 or more  months - No    Symptoms of OCD: obsessions (recurrent thoughts/urges/images; intrusive and/or unwanted; uses other thoughts/actions to suppress) - No; compulsions (repetitive behaviors used to lower distress/anxiety/obsessions) - No, time-consuming (over 1 hour per day) or cause significant distress/impairment - - No    Symptoms of Anorexia: restriction of caloric intake leading to significantly low body weight - No, intense fear of gaining weight or persistent behavior that interferes with weight gain even thought at a significantly low weight - No, disturbance in the way in which one's body weight or shape is experienced, undue influence of body weight or shape on self evaluation, or persistent lack of recognition of the seriousness of the current low body weight - No    Symptoms of Bulimia: recurrent episodes of binge eating (definitely larger amount  than what others would eat and lack of a sense of control over eating during episode) - No, recurrent inappropriate compensatory behaviors in order to prevent weight gain (fasting, medications, exercise, vomiting) - No, binges and compensatory behaviors both occur on average at least once a week for 3 months - No, self evaluations is unduly influenced by body shape/weight- - No    Symptoms of Binge eating: recurrent episodes of binge eating (definitely larger amount than what others would eat and lack of a sense of control over eating during episode) - No, 3 or more of following (eating much more rapidly, eating until uncomfortably full, large amounts when not hungry,  eating alone because of embarrassed by how much,  feeling disgusted with oneself, depressed or very guilty afterward) - No, distress regarding binges - No, binges occur on average at least once a week for 3 months - No      Substance/s:  Taken in larger amounts or over longer periods than intended: No,  Persistent desire or unsuccessful attempts to cut down or stop: No,  Great deal of time spent seeking, using or recovering from: No,  Craving or strong desire to use: No,  Recurrent use despite failure to meet major role obligation: No,  Continued use despite persistent or recurrent social/interparsonal issues due to use: No,  Important social/work/recreational activities given up due to use: No,  Recurrent use in physically hazardous situations: No,  Continued use despite knowledge of persistent physical or psychological problem: No,  Tolerance (either increased need or diminished effect): No,     reviewed Hydrocodone   mg #20 filled on 12/5/23; valium 10 mg #2 filled on 12/1      Psychotherapy:  Target symptoms: depression, anxiety   Why chosen therapy is appropriate versus another modality: relevant to diagnosis, patient responds to this modality, evidence based practice  Outcome monitoring methods: self-report, observation  Therapeutic intervention type: insight oriented psychotherapy, behavior modifying psychotherapy, supportive psychotherapy, interactive psychotherapy  Topics discussed/themes: building skills sets for symptom management, symptom recognition  The patient's response to the intervention is accepting. The patient's progress toward treatment goals is limited.   Duration of intervention: 16 minutes.      PAST PSYCHIATRIC HISTORY  Previous Psychiatric Hospitalizations: Yes  Previous SI/HI: No,  Previous Suicide Attempts: No,   Previous Medication Trials: No,  Psychiatric Care (current & past): No,  History of Psychotherapy: No,  History of Violence: No, throws objects when mad  History of  sexual/physical abuse: No,    PAST MEDICAL & SURGICAL HISTORY   Past Medical History:   Diagnosis Date    COVID     HX OF    Hyperemesis gravidarum      Past Surgical History:   Procedure Laterality Date    HERNIA REPAIR      SALPINGOOPHORECTOMY Left 8/16/2022    Procedure: SALPINGO-OOPHORECTOMY;  Surgeon: John Mcbride MD;  Location: [facility];  Service: OB/GYN;  Laterality: Left;  unilateral     TONSILLECTOMY           CURRENT PSYCH MEDICATION REGIMEN   denied  Current Medication side effects:  n/a  Current Medication compliance:  n/a    Previous psych meds trials  denied    Home Meds:   Prior to Admission medications    Medication Sig Start Date End Date Taking? Authorizing Provider   PNV,calcium 72/iron/folic acid (PRENATAL VITAMIN) Tab Take 2 tablets by mouth once daily. TAKES 2 GUMMIES DAILY    Provider, Historical   tamsulosin (FLOMAX) 0.4 mg Cap Take 1 capsule (0.4 mg total) by mouth once daily. for 10 days 10/31/23 11/10/23  Baljeet Milligan, NP         OTC Meds: none    Scheduled Meds:    famotidine  20 mg Oral BID      PRN Meds: acetaminophen, aluminum-magnesium hydroxide-simethicone, benzonatate, benztropine mesylate, hydrOXYzine pamoate, loperamide, melatonin, nicotine, OLANZapine **AND** OLANZapine, ondansetron, promethazine   Psychotherapeutics (From admission, onward)      Start     Stop Route Frequency Ordered    12/10/23 1937  OLANZapine tablet 10 mg  (Olanzapine PRN (</= 64 yo))        See Hyperspace for full Linked Orders Report.    -- Oral Every 8 hours PRN 12/10/23 1838    12/10/23 1937  OLANZapine injection 10 mg  (Olanzapine PRN (</= 64 yo))        See Hyperspace for full Linked Orders Report.    -- IM Every 8 hours PRN 12/10/23 1838            ALLERGIES   Review of patient's allergies indicates:  No Known Allergies    NEUROLOGIC HISTORY  Seizures: No  Head trauma: No    SOCIAL HISTORY:  Developmental/Childhood:Achieved all developmental milestones timely  Education:High School  Diploma  Employment Status/Finances:Homemaker   Relationship Status/Sexual Orientation: in a committed relationship  Children: 1  Housing Status: Home-  with boyfriend Shaheen and their 3 month old son.    history:  NO   Access to Firearms: NO ;  Locked up? n/a  Anabaptism:Non-practicing- Mosque  Recreational activities:Fishing    SUBSTANCE ABUSE HISTORY   Recreational Drugs: marijuana   Use of Alcohol: denied  Rehab History:no   Tobacco Use:no    LEGAL HISTORY:   Past charges/incarcerations: NO  Pending charges:NO    FAMILY PSYCHIATRIC HISTORY   Family History   Problem Relation Age of Onset    No Known Problems Mother     No Known Problems Father        Unknown to patient       ROS  General ROS: negative  Ophthalmic ROS: negative  ENT ROS: negative  Allergy and Immunology ROS: negative  Hematological and Lymphatic ROS: negative  Endocrine ROS: negative  Respiratory ROS: no cough, shortness of breath, or wheezing  Cardiovascular ROS: no chest pain or dyspnea on exertion  Gastrointestinal ROS: no abdominal pain, change in bowel habits, or black or bloody stools  Genito-Urinary ROS: no dysuria, trouble voiding, or hematuria  Musculoskeletal ROS: negative  Neurological ROS: no TIA or stroke symptoms  Dermatological ROS: negative        EXAMINATION    PHYSICAL EXAM  Reviewed note/exam by Dr. Joseph from 12/10/23 at 3:38 PM; Med consulted for initial physical exam- pending     VITALS   Vitals:    12/11/23 0748   BP: 130/83   Pulse: 103   Resp: 18   Temp: 98.9 °F (37.2 °C)        Body mass index is 24.24 kg/m².        PAIN  0/10  Subjective report of pain matches objective signs and symptoms: Yes    LABORATORY DATA   Recent Results (from the past 72 hour(s))   Urinalysis, Reflex to Urine Culture Urine, Clean Catch    Collection Time: 12/10/23  3:42 PM    Specimen: Urine, Clean Catch   Result Value Ref Range    Specimen UA Urine, Clean Catch     Color, UA Yellow Yellow, Straw, Rolanda    Appearance, UA Clear Clear     pH, UA 8.0 5.0 - 8.0    Specific Gravity, UA 1.015 1.005 - 1.030    Protein, UA 1+ (A) Negative    Glucose, UA Negative Negative    Ketones, UA Negative Negative    Bilirubin (UA) Negative Negative    Occult Blood UA Negative Negative    Nitrite, UA Negative Negative    Urobilinogen, UA Negative <2.0 EU/dL    Leukocytes, UA Trace (A) Negative   Drug screen panel, emergency    Collection Time: 12/10/23  3:42 PM   Result Value Ref Range    Benzodiazepines Presumptive Positive (A) Negative    Methadone metabolites Negative Negative    Cocaine (Metab.) Negative Negative    Opiate Scrn, Ur Presumptive Positive (A) Negative    Barbiturate Screen, Ur Negative Negative    Amphetamine Screen, Ur Negative Negative    THC Presumptive Positive (A) Negative    Phencyclidine Negative Negative    Creatinine, Urine 137.0 15.0 - 325.0 mg/dL    Toxicology Information SEE COMMENT    Pregnancy, urine rapid    Collection Time: 12/10/23  3:42 PM   Result Value Ref Range    Preg Test, Ur Negative    Urinalysis Microscopic    Collection Time: 12/10/23  3:42 PM   Result Value Ref Range    RBC, UA 2 0 - 4 /hpf    WBC, UA 10 (H) 0 - 5 /hpf    Bacteria Negative None-Occ /hpf    Squam Epithel, UA 6 /hpf    Hyaline Casts, UA 1.5 (A) 0-1/lpf /lpf    Microscopic Comment SEE COMMENT    Acetaminophen level    Collection Time: 12/10/23  3:45 PM   Result Value Ref Range    Acetaminophen (Tylenol), Serum <2.0 (L) 10.0 - 20.0 ug/mL   CBC auto differential    Collection Time: 12/10/23  3:46 PM   Result Value Ref Range    WBC 14.22 (H) 3.90 - 12.70 K/uL    RBC 4.21 4.00 - 5.40 M/uL    Hemoglobin 11.6 (L) 12.0 - 16.0 g/dL    Hematocrit 35.8 (L) 37.0 - 48.5 %    MCV 85 82 - 98 fL    MCH 27.6 27.0 - 31.0 pg    MCHC 32.4 32.0 - 36.0 g/dL    RDW 14.3 11.5 - 14.5 %    Platelets 388 150 - 450 K/uL    MPV 9.9 9.2 - 12.9 fL    Immature Granulocytes 2.5 (H) 0.0 - 0.5 %    Gran # (ANC) 10.2 (H) 1.8 - 7.7 K/uL    Immature Grans (Abs) 0.35 (H) 0.00 - 0.04 K/uL     "Lymph # 3.0 1.0 - 4.8 K/uL    Mono # 0.6 0.3 - 1.0 K/uL    Eos # 0.0 0.0 - 0.5 K/uL    Baso # 0.08 0.00 - 0.20 K/uL    nRBC 0 0 /100 WBC    Gran % 71.5 38.0 - 73.0 %    Lymph % 21.4 18.0 - 48.0 %    Mono % 3.9 (L) 4.0 - 15.0 %    Eosinophil % 0.1 0.0 - 8.0 %    Basophil % 0.6 0.0 - 1.9 %    Differential Method Automated    Comprehensive metabolic panel    Collection Time: 12/10/23  3:46 PM   Result Value Ref Range    Sodium 140 136 - 145 mmol/L    Potassium 3.4 (L) 3.5 - 5.1 mmol/L    Chloride 108 95 - 110 mmol/L    CO2 26 23 - 29 mmol/L    Glucose 88 70 - 110 mg/dL    BUN 14 6 - 20 mg/dL    Creatinine 1.0 0.5 - 1.4 mg/dL    Calcium 8.6 (L) 8.7 - 10.5 mg/dL    Total Protein 7.2 6.0 - 8.4 g/dL    Albumin 3.6 3.5 - 5.2 g/dL    Total Bilirubin 0.3 0.1 - 1.0 mg/dL    Alkaline Phosphatase 44 (L) 55 - 135 U/L    AST 13 10 - 40 U/L    ALT 21 10 - 44 U/L    eGFR >60.0 >60 mL/min/1.73 m^2    Anion Gap 6 3 - 11 mmol/L   TSH    Collection Time: 12/10/23  3:46 PM   Result Value Ref Range    TSH 1.330 0.400 - 4.000 uIU/mL   Ethanol    Collection Time: 12/10/23  3:46 PM   Result Value Ref Range    Alcohol, Serum <3 <10 mg/dL   Hemoglobin A1c    Collection Time: 12/10/23  3:46 PM   Result Value Ref Range    Hemoglobin A1C 5.2 4.0 - 5.6 %    Estimated Avg Glucose 103 68 - 131 mg/dL   Lipid Panel    Collection Time: 12/10/23  3:46 PM   Result Value Ref Range    Cholesterol 168 120 - 199 mg/dL    Triglycerides 131 30 - 150 mg/dL    HDL 41 40 - 75 mg/dL    LDL Cholesterol 100.8 63.0 - 159.0 mg/dL    HDL/Cholesterol Ratio 24.4 20.0 - 50.0 %    Total Cholesterol/HDL Ratio 4.1 2.0 - 5.0    Non-HDL Cholesterol 127 mg/dL      No results found for: "PHENYTOIN", "PHENOBARB", "VALPROATE", "CBMZ"        CONSTITUTIONAL  General Appearance: unremarkable, age appropriate, neatly groomed    MUSCULOSKELETAL  Muscle Strength and Tone:no dyskinesia, no dystonia, no tremor, no tic  Abnormal Involuntary Movements: No  Gait and Station: " non-ataxic    PSYCHIATRIC   Level of Consciousness: awake and alert   Orientation: person, place, time, and situation  Grooming: Casually dressed and Well groomed  Psychomotor Behavior: normal, cooperative, eye contact normal, no PMA/R  Speech: normal tone, normal rate, normal pitch, normal volume, spontaneous  Language: grossly intact, able to name, able to repeat  Mood: anxious  Affect: Anxious, Consistent with mood, and Congruent with thought  Thought Process: linear, logical  Associations: intact   Thought Content: denies SI, denies HI, and no delusions  Perceptions: denies AH and denies  VH  Memory: Able to recall past events, Remote intact, and Recent intact  Attention:Normal and Attends to interview without distraction  Fund of Knowledge: Aware of current events and Vocabulary appropriate   Estimate if Intelligence:  Average based on work/education history, vocabulary and mental status exam  Insight: intact, has awareness of illness- fair  Judgment: behavior is adequate to circumstances, age appropriate- fair      PSYCHOSOCIAL    PSYCHOSOCIAL STRESSORS   relationship    FUNCTIONING RELATIONSHIPS   good support system and strained with spouse or significant others    STRENGTHS AND LIABILITIES   Strength: Patient accepts guidance/feedback, Strength: Patient is expressive/articulate., Liability: Patient is unstable., Liability: Patient lacks coping skills.    Is the patient aware of the biomedical complications associated with substance abuse and mental illness? yes    Does the patient have an Advance Directive for Mental Health treatment? no  (If yes, inform patient to bring copy.)        MEDICAL DECISION MAKING        ASSESSMENT       MDD, single episode severe without psychotic features  R/o PDD; r/o Adjustment disorder    Cannabis abuse    Psychosocial stressors     Elevated WBC      PROBLEM LIST AND MANAGEMENT PLANS    Depression: pt counseled  -pt declined pharmacotherapy  -psychotherapy  provided    Cannabis abuse: pt counseled    Psychosocial stressors: pt counseled  -SW consulted to assist with resources     Elevated WBC: pt counseled  -Med consulted   -pt s/p dental procedure       PRESCRIPTION DRUG MANAGEMENT  Compliance: yes  Side Effects: no  Regimen Adjustments: see above    Discussed diagnosis, risks and benefits of proposed treatment vs alternative treatments vs no treatment, potential side effects of these treatments and the inherent unpredictability of treatment. The patient expresses understanding of the above and displays the capacity to agree with this treatment given said understanding. Patient also agrees that, currently, the benefits outweigh the risks and would like to pursue/continue treatment at this time.    Any medications being used off-label were discussed with the patient inclusive of the evidence base for the use of the medications and consent was obtained for the off-label use of the medication.         DIAGNOSTIC TESTING  Labs reviewed with patient; follow up pending labs    Disposition:  -Will attempt to obtain outside psychiatric records if available  -SW to assist with aftercare planning and collateral  -Once stable discharge home with outpatient follow up care and/or rehab  -Continue inpatient treatment under a PEC and/or CEC for danger to self/ danger to others/grave disability as evident by danger to self and suicidal ideation    Will monitor for full 72 hours and discharge home if stable (likely stable for discharge on Wednesday    Rhett Quintero MD  Psychiatry

## 2023-12-11 NOTE — PLAN OF CARE
Problem: Adult Behavioral Health Plan of Care  Goal: Plan of Care Review  Outcome: Ongoing, Progressing  Goal: Patient-Specific Goal (Individualization)  Outcome: Ongoing, Progressing  Goal: Adheres to Safety Considerations for Self and Others  Outcome: Ongoing, Progressing  Goal: Absence of New-Onset Illness or Injury  Outcome: Ongoing, Progressing  Goal: Optimized Coping Skills in Response to Life Stressors  Outcome: Ongoing, Progressing     Problem: Activity and Energy Impairment (Depressive Signs/Symptoms)  Goal: Optimized Energy Level (Depressive Signs/Symptoms)  Outcome: Ongoing, Progressing     Problem: Cognitive Impairment (Depressive Signs/Symptoms)  Goal: Optimized Cognitive Function  Outcome: Ongoing, Progressing     Problem: Decreased Participation/Engagement (Depressive Signs/Symptoms)  Goal: Increased Participation and Engagement (Depressive Signs/Symptoms)  Outcome: Ongoing, Progressing     Problem: Feelings of Worthlessness, Hopelessness or Excessive Guilt (Depressive Signs/Symptoms)  Goal: Enhanced Self-Esteem and Confidence (Depressive Signs/Symptoms)  Outcome: Ongoing, Progressing     Problem: Mood Impairment (Depressive Signs/Symptoms)  Goal: Improved Mood Symptoms (Depressive Signs/Symptoms)  Outcome: Ongoing, Progressing     Problem: Nutrition Imbalance (Depressive Signs/Symptoms)  Goal: Optimized Nutrition Intake  Outcome: Ongoing, Progressing     Problem: Psychomotor Impairment (Depressive Signs/Symptoms)  Goal: Improved Psychomotor Symptoms (Depressive Signs/Symptoms)  Outcome: Ongoing, Progressing     Problem: Sleep Disturbance (Depressive Signs/Symptoms)  Goal: Improved Sleep (Depressive Signs/Symptoms)  Outcome: Ongoing, Progressing     Problem: Social, Occupational or Functional Impairment (Depressive Signs/Symptoms)  Goal: Enhanced Social, Occupational or Functional Skills (Depressive Signs/Symptoms)  Outcome: Ongoing, Progressing     Problem: Suicidal Behavior  Goal: Suicidal  Behavior is Absent or Managed  Outcome: Ongoing, Progressing

## 2023-12-12 PROCEDURE — 11400000 HC PSYCH PRIVATE ROOM

## 2023-12-12 PROCEDURE — 90833 PR PSYCHOTHERAPY W/PATIENT W/E&M, 30 MIN (ADD ON): ICD-10-PCS | Mod: AF,HA,, | Performed by: PSYCHIATRY & NEUROLOGY

## 2023-12-12 PROCEDURE — 90833 PSYTX W PT W E/M 30 MIN: CPT | Mod: AF,HA,, | Performed by: PSYCHIATRY & NEUROLOGY

## 2023-12-12 PROCEDURE — 25000003 PHARM REV CODE 250: Performed by: PSYCHIATRY & NEUROLOGY

## 2023-12-12 PROCEDURE — 99232 SBSQ HOSP IP/OBS MODERATE 35: CPT | Mod: AF,HA,, | Performed by: PSYCHIATRY & NEUROLOGY

## 2023-12-12 PROCEDURE — 99232 PR SUBSEQUENT HOSPITAL CARE,LEVL II: ICD-10-PCS | Mod: AF,HA,, | Performed by: PSYCHIATRY & NEUROLOGY

## 2023-12-12 PROCEDURE — 25000003 PHARM REV CODE 250: Performed by: INTERNAL MEDICINE

## 2023-12-12 RX ADMIN — PENICILLIN V POTASSIUM 500 MG: 250 TABLET, FILM COATED ORAL at 08:12

## 2023-12-12 RX ADMIN — FAMOTIDINE 20 MG: 20 TABLET, FILM COATED ORAL at 08:12

## 2023-12-12 RX ADMIN — PENICILLIN V POTASSIUM 500 MG: 250 TABLET, FILM COATED ORAL at 12:12

## 2023-12-12 RX ADMIN — PENICILLIN V POTASSIUM 500 MG: 250 TABLET, FILM COATED ORAL at 04:12

## 2023-12-12 NOTE — PLAN OF CARE
Collateral:   Shaheen Rojas, boyfriend, [phone]    Collateral Perception of Problem:  she was acting out of character; screaming, throwing things.  I think she was  overwhelmed with just having baby,  4 wisdom teeth extracted, insecurities within herself.  She been wanting to speak with a counselor but hadn't had a chance to schedule an appointment. She screamed I wish I was dead, I don't  think she would harm herself she has a baby but I was scared for her and I had never been in that kind of situation before, you just never know        Previous Psych History/Hospitalizations:  None     Suicide Attempts (how/severity):  None     How long has pt had problems (childhood dx?):  Just that day     Impulse issues:  None     History of violence:   None     Drug Use:  None     Alcohol Use:  Seldom, once a month     Legal Issues:  None     Other Pertinent Info:       Baseline:  Normally happy    Discharge Plan:  Home

## 2023-12-12 NOTE — PLAN OF CARE
12/12/23 1320   Step 1: Warning Signs   Warning Sign 1 being ignored, not feeling like im being heard   Warning Sign 2 others not understanding my feelings   Warning Sign 3 feeling overwhelmed   Step 2: Internal coping strategies - Things I can do to take my mind off my problems without contacting another person:   Coping Strategy 1 work out   Coping Strategy 2 dance   Coping Strategy 3 listen to music   Step 3: People and social settings that provide distraction:   1. Name Apoorva Pike (friend)       Phone [phone]   2. Name Geurda Montejo (friend)   3. Place [facility]   4. Place going for a ride    Step 4: People whom I can ask for help:   1. Person Sharyn Lockwood (mother)       Phone [phone]   2. Person Shaheen Rojas (boyfriend/child's father)       Phone [phone]   3. Person Guerda Montejo   Step 5: Professionals or agencies I can contact during a crisis:   1. Clinician Name [facility]       Phone [phone]   3. Suicide Prevention Lifeline: 988 Suicide Prevention Lifeline 988   4. [facility]       911       Emergency Services Phone Warm line [phone] wed-sun 5pm-10pm   Step 6: Making the environment safer (plan for lethal means safety)   Safe Environment Plan taking a breather, walking away before conflicts starts   Safe Environment Optional: What is most important to me and worth living for? my child and family   Safety Plan Tasks   Provided a Hard Copy to the Patient Y   Explained How to Follow the Steps Y   Discussed Facilitators and Barriers Y

## 2023-12-12 NOTE — PLAN OF CARE
Behavioral Health Unit  Psychosocial History and Assessment  Progress Note      Patient Name: Lilly Lockwood YOB: 2005 SW: Maya Medellin FAITHJAVON  Date: 12/12/2023    Chief Complaint: depression and suicidal ideation    Consent:     Did the patient consent for an interview with the ? Yes    Did the patient consent for the  to contact family/friend/caregiver?   Yes  Name: Shaheen Rojas, Relationship: boyfriend, and Contact: [phone]    Did the patient give consent for the  to inform family/friend/caregiver of his/her whereabouts or to discuss discharge planning? Yes    Source of Information: Face to face with patient and Telephone interview with family/friend/caregiver    Is information obtained from interviews considered reliable?   yes    Reason for Admission:     Active Hospital Problems    Diagnosis  POA    *Depression with suicidal ideation [F32.A, R45.851]  Not Applicable    Verbalizes suicidal thoughts [R45.851]  Not Applicable    Polysubstance abuse [F19.10]  Yes      Resolved Hospital Problems    Diagnosis Date Resolved POA    Tetrahydrocannabinol (THC) use disorder, mild, abuse [F12.10] 12/11/2023 Unknown       History of Present Illness - (Patient Perception):   I got pissed off about a jacket I brought my boyfriend that he didn't like. I went for a ride to calm down my boyfriend called and said bring his car back. I tried to talk to him and he wasn't listening so I told him I wanted to kill myself. He recorded me and sent it to my mom and the ambulance arrived to my home. I never wanted to hurt myself I just wanted a reaction from him.      History of Present Illness - (Perception of Others): She was acting out of character; screaming, throwing things.  I think she was  overwhelmed with just having baby,  4 wisdom teeth extracted, insecurities within herself.  She been wanting to speak with a counselor but hadn't had a chance to schedule an  appointment. She screamed I wish I was dead, I don't  think she would harm herself she has a baby but I was scared for her and I had never been in that kind of situation before, you just never know  according to Shaheen Rojas    Present biopsychosocial functioning: Per MD Note, Pt is a 18 y.o. female with no past psychiatric history  currently admitted to the inpatient unit with the following chief complaint: depression/SI. Pt denies HI/AVH. Pt UDS was positive for benzos, opiate, and marijuana. Pt reports prescribed opiate and benzos. Pt can perform all ADLs and IADLs. Pt is in a relationship. Pt lives in home with boyfriend and infant son. Pt is unemployed. Pt denies recent stressors, changes, or losses.     Past biopsychosocial functioning: Pt denies previous inpatient and outpatient treatments. Pt denies previous suicide attempts.     Family and Marital/Relationship History:     Significant Other/Partner Relationships:  Partnered    Family Relationships: Intact      Childhood History:     Where was [place] raised[place]     Who raised the patient? Mother and Stepfather       How does patient describe their childhood? Great       Who is patient's primary support person? Guerda Mcgovern       Culture and Temple:     Temple: Orthodox    How strong of a role does Sikhism and spirituality play in patient's life? Strong     Anabaptist or spiritual concerns regarding treatment: not applicable     History of Abuse:   History of Abuse: Denies      Psychiatric and Medical History:     History of psychiatric illness or treatment: none    Medical history:   Past Medical History:   Diagnosis Date    COVID     HX OF    Hyperemesis gravidarum        Substance Abuse History:     Alcohol - (Patient Perspective):   Social History     Substance and Sexual Activity   Alcohol Use Yes    Comment: occ use       Alcohol - (Collateral Perspective): Seldom according to Shaheen Rojas     Drugs - (Patient Perspective):   Social  History     Substance and Sexual Activity   Drug Use Yes    Types: Marijuana    Comment: pt reports prescribed valium and hydrocodone       Drugs - (Collateral Perspective): None according to Shaheen Rojas       Education:     Currently Enrolled? No  High School (9-12) or GED    Special Education? No    Interested in Completing Education/GED: Yes     Employment and Financial:     Currently employed? Unemployed     Source of Income:  none     Able to afford basic needs (food, shelter, utilities)? Pt reports boyfriend provides basic needs     Who manages finances/personal affairs? Not applicable       Service:     ? no    Combat Service? No     Community Resources:     Describe present use of community resources: none reported      Identify previously used community resources   (Include previous mental health treatment - outpatient and inpatient): none reported     Environmental:     Current living situation:Lives with family, Lives in home    Social Evaluation:     Patient Assets: General fund of knowledge, Capable of independent living, and Communicable skills    Patient Limitations: unemployed    High risk psychosocial issues that may impact discharge planning:   None identified     Recommendations:     Anticipated discharge plan:   outpatient follow up, home with family     High risk issues requiring early treatment planning and immediate intervention: depression/SI    Community resources needed for discharge planning:  aftercare treatment sources    Anticipated social work role(s) in treatment and discharge planning: SW will facilitate process groups to assist pt develop healthy coping skills; CM will arrange outpatient follow-up appointments and assist with discharge planning.

## 2023-12-12 NOTE — PLAN OF CARE
Patient more talkative today.  Still very concerned about her baby.  Compliant with all of her medications.  Up in the dining room for all meals.  Spoke several times on the phone with different people.  Seems more cheerful today.  Denies any C/O noted at this time.  No suicidal ideations noted.   Continue to monitor behavior and to keep patient safe.   Yulia Murphy, TRACEYN   Abdominal closure, washout

## 2023-12-12 NOTE — NURSING
POC reviewed this shift and is ongoing.  Pt calm and cooperative, out in the activity area interacting appropriately with staff and peers.  Pt denies SI/HI/AVH.  Pt intermittently tearful about being  from her baby.

## 2023-12-13 VITALS
HEART RATE: 97 BPM | BODY MASS INDEX: 24.19 KG/M2 | TEMPERATURE: 99 F | RESPIRATION RATE: 20 BRPM | HEIGHT: 59 IN | WEIGHT: 120 LBS | SYSTOLIC BLOOD PRESSURE: 135 MMHG | DIASTOLIC BLOOD PRESSURE: 71 MMHG | OXYGEN SATURATION: 96 %

## 2023-12-13 PROBLEM — F32.A DEPRESSION WITH SUICIDAL IDEATION: Status: RESOLVED | Noted: 2023-12-11 | Resolved: 2023-12-13

## 2023-12-13 PROBLEM — R45.851 DEPRESSION WITH SUICIDAL IDEATION: Status: RESOLVED | Noted: 2023-12-11 | Resolved: 2023-12-13

## 2023-12-13 PROBLEM — F43.23 ADJUSTMENT DISORDER WITH MIXED ANXIETY AND DEPRESSED MOOD: Status: ACTIVE | Noted: 2023-12-13

## 2023-12-13 PROCEDURE — 90833 PR PSYCHOTHERAPY W/PATIENT W/E&M, 30 MIN (ADD ON): ICD-10-PCS | Mod: AF,HA,, | Performed by: PSYCHIATRY & NEUROLOGY

## 2023-12-13 PROCEDURE — 90833 PSYTX W PT W E/M 30 MIN: CPT | Mod: AF,HA,, | Performed by: PSYCHIATRY & NEUROLOGY

## 2023-12-13 PROCEDURE — 99239 HOSP IP/OBS DSCHRG MGMT >30: CPT | Mod: AF,HA,, | Performed by: PSYCHIATRY & NEUROLOGY

## 2023-12-13 PROCEDURE — 25000003 PHARM REV CODE 250: Performed by: INTERNAL MEDICINE

## 2023-12-13 PROCEDURE — 99239 PR HOSPITAL DISCHARGE DAY,>30 MIN: ICD-10-PCS | Mod: AF,HA,, | Performed by: PSYCHIATRY & NEUROLOGY

## 2023-12-13 RX ADMIN — PENICILLIN V POTASSIUM 500 MG: 250 TABLET, FILM COATED ORAL at 08:12

## 2023-12-13 NOTE — PROGRESS NOTES
Psychotherapy:  Target symptoms: depression, anxiety   Why chosen therapy is appropriate versus another modality: relevant to diagnosis, patient responds to this modality, evidence based practice  Outcome monitoring methods: self-report, observation  Therapeutic intervention type: insight oriented psychotherapy, behavior modifying psychotherapy, supportive psychotherapy, interactive psychotherapy  Topics discussed/themes: relationships difficulties, difficulty managing affect in interpersonal relationships, building skills sets for symptom management, symptom recognition  Safety planning and wrap up session  The patient's response to the intervention is accepting. The patient's progress toward treatment goals is fair.   Duration of intervention: 16 minutes.  Rhett Quintero MD  Psychiatry

## 2023-12-13 NOTE — DISCHARGE SUMMARY
"Discharge Summary  Psychiatry    Admit Date: 12/10/2023    Discharge Date and Time:  12/13/2023 8:45 AM    Attending Physician: Rhett Quintero MD     Discharge Provider: Rhett Quintero MD    Reason for Admission:  depression/SI, "we had an argument and I got upset."     History of Present Illness:   The patient presented to the ER on 12/10/2023 . Per staff notes:   -Pt to ED via [facility] for psych eval. Patient denies SI/HI. Recent pregnancy. States she was angry with boyfriend and said stupid things she doesn't mean   -18-year-old female presents the emergency department with the police, brought her because she made some suicidal statements to her significant other.  Patient states she is not suicidal, we made a statement because she was angry with him.  They were arguing at home, and states she "made a stupid statement".  Denies HI, denies SI.   Patient allegedly fought with boyfriend and mother over an opiate bottle stating she was going to overdose.  Confirmed by the boyfriend  -The patient is a 18 year old woman who denies any past psychiatric history to me who is 3 months postpartum sent in by police after her family called 911 because of suicidal statements and or behavior today. The patient essentially denies that she is suicidal and or needs hospitalization. She does state that she noticed she is more irritable for the past couple of months. She noticed that she is easily triggered. She admits that she can lose her temper at times. She attributes this to her boyfriend lacking empathy for her situation as a new mother whose body has gone through significant changes this year. She states that he often ignores her or antagonizes her and therefore triggers her anger outbursts. She admits to having made a number of statements today and says she can't remember all of them and may have said that she wanted to kill herself but denies that she actually did. She says that they had an argument about a " trivial matter and she left the house to take the baby to her mother in law's. She says her boyfriend then called her to demand she returned the car since it's in his name. She says she went and took the car back and tried to talk to him but he wouldn't listen to her and she became angry and then made the statements. She says that he recorded her and subsequently paid played the recording for her mother who is the person who called the police. She denies any history of depression anxiety past counseling past hospitalization past suicide attempts or past medication trials. She states that she's getting 11:50 hours of sleep per night because her three month old is sleeping well per night overnight. She denies any problems with energy level appetite concentration or self esteem. She states it is good to be alive. No psychotic symptoms. I asked her what her boyfriend and mother were likely to say when I called them for collateral and she replied that they would say she is crazy however she's unable to elaborate. New paragraph I called her mother April. Her mother states that the patient has been easily triggered and overly emotional for three to four years period she describes her daughter as manipulative often throwing what she calls adult style temper tantrums when she doesn't get her way etc. She attributes some of this to her daughter using marijuana. She states there was in fact a hospitalization when she was 12. She says she caught her daughter using cannabis and the patient stated she was suicidal. However once she got to the hospital she stated that it was just a joke and she had just said it to get away from her mom because she was afraid she would be severely punished. Mom does describe an escalation in the intensity and frequency of the outbursts over the past one to two months with expressions of a desire to die now several times per week. She's concerned because of the new baby at home. She states that the  "patient doesn't seem to be very connected to her grandson. She states that she is trying to get babysitters as much as possible and not spending very much time with him. She further states that the boyfriend Shaheen told her that he and or the police had to wrestle pain pills away from the patient today in order to prevent her from overdosing in a suicide attempt. I had called the boyfriend but was unable to reach him and so I don't have any first hand confirmation of this information.   April says that Shaheen has been sending her recording of the patient's outbursts more than weekly in recent weeks out of concern that the patient is worsening/decompensating.     ADDENDUM - reached Shaheen who confirmed the wrestling away of the pill bottle (she had her wisdom teeth out last week - opioids and antinausea medication plus antibiotics) this afternoon but states the patient is a good mom to their baby and no concerns there        The patient was medically cleared and admitted to the [facility].     The patient reports that she had an argument with her boyfriend over "a hoodie." The argument escalated and she said "something I didn't mean." She discussed her relationship in detail. "He picks and picks."  "I just wanted a reaction from him.     She denied threatening to overdose or having pills in her hand to overdose; she maintains that she had the medications to take what was prescribed.      She has a 3 month old; she noted some post partum blues, but denied post partum depression     She denied all symptoms as documented below. She does not think that she needs to be in the hospital.         Symptoms of Depression: diminished mood - No, loss of interest/anhedonia - No;  recurrent - No, >14 days - No, diminished energy - No, change in sleep - No, change in appetite - No, diminished concentration or cognition or indecisiveness - No, PMA/R -  No, excessive guilt or hopelessness or worthlessness - No, suicidal ideations - No   " "  Changes in Sleep: trouble with initiation- No, maintenance, - No early morning awakening with inability to return to sleep - No, hypersomnolence - No     Suicidal- active/passive ideations - No, organized plans, future intentions - No     Homicidal ideations: active/passive ideations - No, organized plans, future intentions - No     Symptoms of psychosis: hallucinations - No, delusions - No, disorganized speech - No, disorganized behavior or abnormal motor behavior - No, or negative symptoms (diminshed emotional expression, avolition, anhedonia, alogia, asociality) - No, active phase symptoms >1 month - No, continuous signs of illness > 6 months - No, since onset of illness decreased level of functioning present - No     Symptoms of chi or hypomania: elevated, expansive, or irritable mood with increased energy or activity - No; > 4 days - No,  >7 days - No; with inflated self-esteem or grandiosity - No, decreased need for sleep - No, increased rate of speech - No, FOI or racing thoughts - No, distractibility - No, increased goal directed activity or PMA - No, risky/disinhibited behavior - No     Symptoms of FILOMENA: excessive anxiety/worry/fear, more days than not, about numerous issues - No, ongoing for >6 months - No, difficult to control - No, with restlessness - No, fatigue - No, poor concentration - No, irritability - No, muscle tension - No, sleep disturbance - No; causes functionally impairing distress - No     Symptoms of Panic Disorder: recurrent panic attacks (palpitations/heart racing, sweating, shakiness, dyspnea, choking, chest pain/discomfort, Gi symptoms, dizzy/lightheadedness, hot/col flashes, paresthesias, derealization, fear of losing control or fear of dying or fear of "going crazy") - No, precipitated - No, un-precipitated - No, source of worry and/or behavioral changes secondary for 1 month or longer- No, agoraphobia - No     Symptoms of PTSD: h/o trauma exposure - No; re-experiencing/intrusive " symptoms - No, avoidant behavior - No, 2 or more negative alterations in cognition or mood - No, 2 or more hyperarousal symptoms - No; with dissociative symptoms - No, ongoing for 1 or more  months - No     Symptoms of OCD: obsessions (recurrent thoughts/urges/images; intrusive and/or unwanted; uses other thoughts/actions to suppress) - No; compulsions (repetitive behaviors used to lower distress/anxiety/obsessions) - No, time-consuming (over 1 hour per day) or cause significant distress/impairment - - No     Symptoms of Anorexia: restriction of caloric intake leading to significantly low body weight - No, intense fear of gaining weight or persistent behavior that interferes with weight gain even thought at a significantly low weight - No, disturbance in the way in which one's body weight or shape is experienced, undue influence of body weight or shape on self evaluation, or persistent lack of recognition of the seriousness of the current low body weight - No     Symptoms of Bulimia: recurrent episodes of binge eating (definitely larger amount  than what others would eat and lack of a sense of control over eating during episode) - No, recurrent inappropriate compensatory behaviors in order to prevent weight gain (fasting, medications, exercise, vomiting) - No, binges and compensatory behaviors both occur on average at least once a week for 3 months - No, self evaluations is unduly influenced by body shape/weight- - No     Symptoms of Binge eating: recurrent episodes of binge eating (definitely larger amount than what others would eat and lack of a sense of control over eating during episode) - No, 3 or more of following (eating much more rapidly, eating until uncomfortably full, large amounts when not hungry, eating alone because of embarrassed by how much,  feeling disgusted with oneself, depressed or very guilty afterward) - No, distress regarding binges - No, binges occur on average at least once a week for 3  months - No        Substance/s:  Taken in larger amounts or over longer periods than intended: No,  Persistent desire or unsuccessful attempts to cut down or stop: No,  Great deal of time spent seeking, using or recovering from: No,  Craving or strong desire to use: No,  Recurrent use despite failure to meet major role obligation: No,  Continued use despite persistent or recurrent social/interparsonal issues due to use: No,  Important social/work/recreational activities given up due to use: No,  Recurrent use in physically hazardous situations: No,  Continued use despite knowledge of persistent physical or psychological problem: No,  Tolerance (either increased need or diminished effect): No,      reviewed Hydrocodone   mg #20 filled on 12/5/23; valium 10 mg #2 filled on 12/1       Procedures Performed: * No surgery found *    Hospital Course:    Patient was admitted to the inpatient psychiatry unit after being medically cleared in the ED. Chart and labs were reviewed. The patient was stabilized as follows:      Depression: pt counseled  -pt declined pharmacotherapy  -psychotherapy provided     Cannabis abuse: pt counseled     Psychosocial stressors: pt counseled  -SW consulted to assist with resources      Elevated WBC: pt counseled  -Med consulted   -pt s/p dental procedure            During hospitalization, the patient was encouraged to go to both groups and individual counseling. Patient was monitored for any side effects. A meeting was held with multidisciplinary team prior to discharge and pt's diagnosis, current medications, and follow up were discussed. The patient has been compliant with treatment and can adequately attend to activities of daily living in an independent manner. The patient denies any side effects. The patient denies SI, HI, plan or intent for self harm or harm to others. The patient is no longer a danger to self or others nor gravely disabled disabled. Patient discharged  in stable  condition with scheduled outpatient follow up.    The patient reports improved symptoms as documented below. The patient is requesting discharge. The patient is currently stable for discharge home and is able/willing to attend outpatient care. The patient is hopeful, future oriented and goal directed. The patient readily discusses both short and long term goals. The patient can identify positive coping skills and social support.     Psychiatric ROS (observed, reported, or endorsed/denied):  Depressed mood - denies  Interest/pleasure/anhedonia: denies  Guilt/hopelessness/worthlessness - denies  Changes in Sleep - denies  Changes in Appetite - denies  Changes in Concentration - denies  Changes in Energy - denies  PMA/R- denies  Suicidal- active/passive ideations - denies  Homicidal ideations: active/passive ideations - denies     Hallucinations - denies  Delusions - denies  Disorganized behavior - denies  Disorganized speech - denies  Negative symptoms - denies     Elevated mood - denies  Decreased need for sleep - denies  Grandiosity - denies  Racing thoughts - denies  Impulsivity - denies  Irritability- denies  Increased energy - denies  Distractibility - denies  Increase in goal-directed activity or PMA- denies     Symptoms of FILOMENA - denies  Symptoms of Panic Disorder- denies  Symptoms of PTSD - denies      Discussed diagnosis, risks and benefits of proposed treatment vs alternative treatments vs no treatment, and potential side effects of these treatments.  The patient expresses understanding of the above and displays the capacity to agree with this treatment given said understanding.  Patient also agrees that, currently, the benefits outweigh the risks and would like to pursue treatment at this time.      Discharge MSE: stated age, casually dressed, well groomed.  No psychomotor agitation or retardation.  No abnormal involuntary movements.  Gait normal.  Speech normal, conversational.  Language fluent English.  Mood fine.  Affect normal range, pleasant, euthymic.  Thought process linear.  Associations intact.  Denies suicidal or homicidal ideation.  Denies auditory hallucinations, paranoid ideation, ideas of reference.  Memory intact.  Attention intact.  Fund of knowledge intact.  Insight intact.  Judgment intact.  Alert and oriented to person, place, time.      Tobacco Usage:  Is patient a smoker? No  Does patient want prescription for Tobacco Cessation? No  Does patient want counseling for Tobacco Cessation? No    If patient would like to quit, then over the counter nicotine patch could be used. The patient could also follow up with his PCP or psychiatric provider for other alternatives.     Final Diagnoses:    Principal Problem: Adjustment disorder with mixed depressed and anxious features    Secondary Diagnoses:   Unable to r/i or r/o PPD     Cannabis abuse     Psychosocial stressors      Elevated WBC    Labs:  Admission on 12/10/2023   Component Date Value Ref Range Status    WBC 12/10/2023 14.22 (H)  3.90 - 12.70 K/uL Final    RBC 12/10/2023 4.21  4.00 - 5.40 M/uL Final    Hemoglobin 12/10/2023 11.6 (L)  12.0 - 16.0 g/dL Final    Hematocrit 12/10/2023 35.8 (L)  37.0 - 48.5 % Final    MCV 12/10/2023 85  82 - 98 fL Final    MCH 12/10/2023 27.6  27.0 - 31.0 pg Final    MCHC 12/10/2023 32.4  32.0 - 36.0 g/dL Final    RDW 12/10/2023 14.3  11.5 - 14.5 % Final    Platelets 12/10/2023 388  150 - 450 K/uL Final    MPV 12/10/2023 9.9  9.2 - 12.9 fL Final    Immature Granulocytes 12/10/2023 2.5 (H)  0.0 - 0.5 % Final    Gran # (ANC) 12/10/2023 10.2 (H)  1.8 - 7.7 K/uL Final    Immature Grans (Abs) 12/10/2023 0.35 (H)  0.00 - 0.04 K/uL Final    Lymph # 12/10/2023 3.0  1.0 - 4.8 K/uL Final    Mono # 12/10/2023 0.6  0.3 - 1.0 K/uL Final    Eos # 12/10/2023 0.0  0.0 - 0.5 K/uL Final    Baso # 12/10/2023 0.08  0.00 - 0.20 K/uL Final    nRBC 12/10/2023 0  0 /100 WBC Final    Gran % 12/10/2023 71.5  38.0 - 73.0 % Final    Lymph %  12/10/2023 21.4  18.0 - 48.0 % Final    Mono % 12/10/2023 3.9 (L)  4.0 - 15.0 % Final    Eosinophil % 12/10/2023 0.1  0.0 - 8.0 % Final    Basophil % 12/10/2023 0.6  0.0 - 1.9 % Final    Differential Method 12/10/2023 Automated   Final    Sodium 12/10/2023 140  136 - 145 mmol/L Final    Potassium 12/10/2023 3.4 (L)  3.5 - 5.1 mmol/L Final    Chloride 12/10/2023 108  95 - 110 mmol/L Final    CO2 12/10/2023 26  23 - 29 mmol/L Final    Glucose 12/10/2023 88  70 - 110 mg/dL Final    BUN 12/10/2023 14  6 - 20 mg/dL Final    Creatinine 12/10/2023 1.0  0.5 - 1.4 mg/dL Final    Calcium 12/10/2023 8.6 (L)  8.7 - 10.5 mg/dL Final    Total Protein 12/10/2023 7.2  6.0 - 8.4 g/dL Final    Albumin 12/10/2023 3.6  3.5 - 5.2 g/dL Final    Total Bilirubin 12/10/2023 0.3  0.1 - 1.0 mg/dL Final    Alkaline Phosphatase 12/10/2023 44 (L)  55 - 135 U/L Final    AST 12/10/2023 13  10 - 40 U/L Final    ALT 12/10/2023 21  10 - 44 U/L Final    eGFR 12/10/2023 >60.0  >60 mL/min/1.73 m^2 Final    Anion Gap 12/10/2023 6  3 - 11 mmol/L Final    TSH 12/10/2023 1.330  0.400 - 4.000 uIU/mL Final    Specimen UA 12/10/2023 Urine, Clean Catch   Final    Color, UA 12/10/2023 Yellow  Yellow, Straw, Rolanda Final    Appearance, UA 12/10/2023 Clear  Clear Final    pH, UA 12/10/2023 8.0  5.0 - 8.0 Final    Specific Gravity, UA 12/10/2023 1.015  1.005 - 1.030 Final    Protein, UA 12/10/2023 1+ (A)  Negative Final    Glucose, UA 12/10/2023 Negative  Negative Final    Ketones, UA 12/10/2023 Negative  Negative Final    Bilirubin (UA) 12/10/2023 Negative  Negative Final    Occult Blood UA 12/10/2023 Negative  Negative Final    Nitrite, UA 12/10/2023 Negative  Negative Final    Urobilinogen, UA 12/10/2023 Negative  <2.0 EU/dL Final    Leukocytes, UA 12/10/2023 Trace (A)  Negative Final    Benzodiazepines 12/10/2023 Presumptive Positive (A)  Negative Final    Methadone metabolites 12/10/2023 Negative  Negative Final    Cocaine (Metab.) 12/10/2023 Negative  Negative  Final    Opiate Scrn, Ur 12/10/2023 Presumptive Positive (A)  Negative Final    Barbiturate Screen, Ur 12/10/2023 Negative  Negative Final    Amphetamine Screen, Ur 12/10/2023 Negative  Negative Final    THC 12/10/2023 Presumptive Positive (A)  Negative Final    Phencyclidine 12/10/2023 Negative  Negative Final    Creatinine, Urine 12/10/2023 137.0  15.0 - 325.0 mg/dL Final    Toxicology Information 12/10/2023 SEE COMMENT   Final    Alcohol, Serum 12/10/2023 <3  <10 mg/dL Final    Acetaminophen (Tylenol), Serum 12/10/2023 <2.0 (L)  10.0 - 20.0 ug/mL Final    Preg Test, Ur 12/10/2023 Negative   Final    RBC, UA 12/10/2023 2  0 - 4 /hpf Final    WBC, UA 12/10/2023 10 (H)  0 - 5 /hpf Final    Bacteria 12/10/2023 Negative  None-Occ /hpf Final    Squam Epithel, UA 12/10/2023 6  /hpf Final    Hyaline Casts, UA 12/10/2023 1.5 (A)  0-1/lpf /lpf Final    Microscopic Comment 12/10/2023 SEE COMMENT   Final    Hemoglobin A1C 12/10/2023 5.2  4.0 - 5.6 % Final    Estimated Avg Glucose 12/10/2023 103  68 - 131 mg/dL Final    Cholesterol 12/10/2023 168  120 - 199 mg/dL Final    Triglycerides 12/10/2023 131  30 - 150 mg/dL Final    HDL 12/10/2023 41  40 - 75 mg/dL Final    LDL Cholesterol 12/10/2023 100.8  63.0 - 159.0 mg/dL Final    HDL/Cholesterol Ratio 12/10/2023 24.4  20.0 - 50.0 % Final    Total Cholesterol/HDL Ratio 12/10/2023 4.1  2.0 - 5.0 Final    Non-HDL Cholesterol 12/10/2023 127  mg/dL Final         Discharged Condition: stable and improved; not currently a danger to self/others or gravely disabled    Disposition: Home or Self Care    Is patient being discharged on multiple neuroleptics? No    Follow Up/Patient Instructions:     Take all medications as prescribed.  Attend all psychiatric and medical follow up appointments.   Abstain from all drugs and alcohol.  Call the crisis line at: [phone] for help in a crisis and emergent situations or call 911 and Return to ED for any acute worsening of your condition  including suicidal or homicidal ideations      No discharge procedures on file.        Follow up apt: local [facility]- see SW/discharge notes for full details      Medications:  Reconciled Home Medications:      Medication List        STOP taking these medications      prenatal vitamin Tab     tamsulosin 0.4 mg Cap  Commonly known as: FLOMAX                Diet: regular     Activity as tolerated    Total time spent discharging patient: 35 minutes    Rhett Quintero MD  Psychiatry

## 2023-12-13 NOTE — NURSING
POC reviewed this shift and is ongoing.  Pt calm and cooperative on unit and compliant with medications.  Pt out on the unit for much of the evening interacting appropriately with staff and peers.  Pt denies SI/HI/AVH.  Pt voices readiness for discharge tomorrow and looks forward to being reunited with her infant son.  Pt reports anxiety and sadness about being away from him, but an overall improved mood since admission.  Pt states that she and her boyfriend are both planning to attend couples and individual counseling.

## 2023-12-13 NOTE — PLAN OF CARE
Problem: Adult Behavioral Health Plan of Care  Goal: Plan of Care Review  Outcome: Ongoing, Progressing  Goal: Patient-Specific Goal (Individualization)  Outcome: Ongoing, Progressing  Goal: Adheres to Safety Considerations for Self and Others  Outcome: Ongoing, Progressing  Goal: Optimized Coping Skills in Response to Life Stressors  Outcome: Ongoing, Progressing     Problem: Mood Impairment (Depressive Signs/Symptoms)  Goal: Improved Mood Symptoms (Depressive Signs/Symptoms)  Outcome: Ongoing, Progressing

## 2024-06-14 NOTE — PROGRESS NOTES
"PSYCHIATRY DAILY INPATIENT PROGRESS NOTE  SUBSEQUENT HOSPITAL VISIT    ENCOUNTER DATE: 12/12/2023  [facility]    DATE OF ADMISSION: 12/10/2023  3:26 PM  LENGTH OF STAY: 2 days      CHIEF COMPLAINT   Lilly Lockwood is a 18 y.o. female, seen during daily hodge rounds on the inpatient unit.  Lilly Lockwood presented with the chief complaint of depression/SI, "we had an argument and I got upset."       The patient was seen and examined. The chart was reviewed.     Reviewed notes from Rns, MD, RD, and LPN and labs from the last 24 hours.    The patient's case was discussed with the treatment team/care providers today including Rns, CTRS, NP, and SW    Staff reports no behavioral or management issues.     The patient has been compliant with treatment.      Subjective 12/12/2023       Today the patient reports that she is doing well.     She continues to deny all symptoms as documented. She maintains that she was not suicidal, "I said something I didn't mean, because I couldn't get him to listen to me." She denied any intention in recent SI statements. She regrets making her previous statements.  She cites her child as the primary reason she would never hurt herself, "I don't ever want to be away from my family like this again, I would never want to hurt them like that."    She can cite positive social support. She reports that she had a positive conversation with her partner and her family.      She is improving and will be stable for discharge tomorrow pending further improvements.       The patient denies any side effects to medications.        Psychiatric ROS (observed, reported, or endorsed/denied):  Depressed mood - denies  Interest/pleasure/anhedonia: denies  Guilt/hopelessness/worthlessness - denies  Changes in Sleep - denies  Changes in Appetite - denies  Changes in Concentration - denies  Changes in Energy - denies  PMA/R- denies  Suicidal- active/passive ideations - denies  Homicidal " ideations: active/passive ideations - denies    Hallucinations - denies  Delusions - denies  Disorganized behavior - denies  Disorganized speech - denies  Negative symptoms - denies    Elevated mood - denies  Decreased need for sleep - denies  Grandiosity - denies  Racing thoughts - denies  Impulsivity - denies  Irritability- denies  Increased energy - denies  Distractibility - denies  Increase in goal-directed activity or PMA- denies    Symptoms of FILOMENA - denies  Symptoms of Panic Disorder- denies  Symptoms of PTSD - denies        Overall progress: Patient is showing moderate improvement        Psychotherapy:  Target symptoms: depression, anxiety   Why chosen therapy is appropriate versus another modality: relevant to diagnosis, patient responds to this modality, evidence based practice  Outcome monitoring methods: self-report, observation  Therapeutic intervention type: insight oriented psychotherapy, behavior modifying psychotherapy, supportive psychotherapy, interactive psychotherapy  Topics discussed/themes: relationships difficulties, difficulty managing affect in interpersonal relationships, building skills sets for symptom management, symptom recognition  The patient's response to the intervention is accepting. The patient's progress toward treatment goals is fair.   Duration of intervention: 16 minutes.        Medical ROS  General ROS: negative  Ophthalmic ROS: negative  ENT ROS: negative  Allergy and Immunology ROS: negative  Hematological and Lymphatic ROS: negative  Endocrine ROS: negative  Respiratory ROS: no cough, shortness of breath, or wheezing  Cardiovascular ROS: no chest pain or dyspnea on exertion  Gastrointestinal ROS: no abdominal pain, change in bowel habits, or black or bloody stools  Genito-Urinary ROS: no dysuria, trouble voiding, or hematuria  Musculoskeletal ROS: negative  Neurological ROS: no TIA or stroke symptoms  Dermatological ROS: negative      PAST MEDICAL HISTORY   Past Medical  History:   Diagnosis Date    COVID     HX OF    Hyperemesis gravidarum            PSYCHOTROPIC MEDICATIONS   Scheduled Meds:   famotidine  20 mg Oral BID    penicillin v potassium  500 mg Oral QID     Continuous Infusions:  PRN Meds:.acetaminophen, aluminum-magnesium hydroxide-simethicone, benzonatate, benztropine mesylate, hydrOXYzine pamoate, loperamide, melatonin, nicotine, OLANZapine **AND** OLANZapine, ondansetron, promethazine        EXAMINATION    VITALS   Vitals:    12/10/23 1930 12/11/23 0748 12/11/23 1920 12/12/23 0752   BP: 109/64 130/83 130/74 125/70   BP Location: Left arm Right arm Left arm Left arm   Patient Position: Sitting Sitting Sitting Sitting   Pulse: 98 103 72 110   Resp: 16 18 16 18   Temp: 99.2 °F (37.3 °C) 98.9 °F (37.2 °C) 99 °F (37.2 °C) 98.6 °F (37 °C)   TempSrc: Oral Oral Oral Oral   SpO2: 100% 100% 100% 99%   Weight:       Height:           Body mass index is 24.24 kg/m².      CONSTITUTIONAL  General Appearance: unremarkable, age appropriate, neatly groomed     MUSCULOSKELETAL  Muscle Strength and Tone:no dyskinesia, no dystonia, no tremor, no tic  Abnormal Involuntary Movements: No  Gait and Station: non-ataxic     PSYCHIATRIC   Level of Consciousness: awake and alert   Orientation: person, place, time, and situation  Grooming: Casually dressed and Well groomed  Psychomotor Behavior: normal, cooperative, eye contact normal, no PMA/R  Speech: normal tone, normal rate, normal pitch, normal volume, spontaneous  Language: grossly intact, able to name, able to repeat  Mood: anxious  Affect: Anxious, Consistent with mood, and Congruent with thought  Thought Process: linear, logical  Associations: intact   Thought Content: denies SI, denies HI, and no delusions  Perceptions: denies AH and denies  VH  Memory: Able to recall past events, Remote intact, and Recent intact  Attention:Normal and Attends to interview without distraction  Fund of Knowledge: Aware of current events and Vocabulary  appropriate   Estimate if Intelligence:  Average based on work/education history, vocabulary and mental status exam  Insight: intact, has awareness of illness- fair  Judgment: behavior is adequate to circumstances, age appropriate- fair    DIAGNOSTIC TESTING   Laboratory Results  No results found for this or any previous visit (from the past 24 hour(s)).          MEDICAL DECISION MAKING      ASSESSMENT:   Adjustment disorder with mixed depressed and anxious features  Unable to r/i or r/o PPD     Cannabis abuse     Psychosocial stressors      Elevated WBC        PROBLEM LIST AND MANAGEMENT PLANS     Depression: pt counseled  -pt declined pharmacotherapy  -psychotherapy provided     Cannabis abuse: pt counseled     Psychosocial stressors: pt counseled  -SW consulted to assist with resources      Elevated WBC: pt counseled  -Med consulted   -pt s/p dental procedure           Discussed diagnosis, risks and benefits of proposed treatment vs alternative treatments vs no treatment, potential side effects of these treatments and the inherent unpredictability of treatment. The patient expresses understanding of the above and displays the capacity to agree with this treatment given said understanding. Patient also agrees that, currently, the benefits outweigh the risks and would like to pursue/continue treatment at this time.    Any medications being used off-label were discussed with the patient inclusive of the evidence base for the use of the medications and consent was obtained for the off-label use of the medication.       DISCHARGE PLANNING  Expected Disposition Plan: Home or Self Care- discharge home tomorrow if stable      NEED FOR CONTINUED HOSPITALIZATION  Psychiatric illness continues to pose a potential threat to life or bodily function, of self or others, thereby requiring the need for continued inpatient psychiatric hospitalization: Yes, due to: danger to self and suicidal ideation, as evidenced by:  Concerns  with SI--Fading.    Protective inpatient pyschiatric hospitalization required while a safe disposition plan is enacted: Yes    Patient stabilized and ready for discharge from inpatient psychiatric unit: No        STAFF:   Rhett Quintero MD  Psychiatry       3

## 2024-11-13 ENCOUNTER — HOSPITAL ENCOUNTER (EMERGENCY)
Facility: HOSPITAL | Age: 19
Discharge: HOME | End: 2024-11-13
Attending: EMERGENCY MEDICINE
Payer: MEDICAID

## 2024-11-13 VITALS
DIASTOLIC BLOOD PRESSURE: 85 MMHG | SYSTOLIC BLOOD PRESSURE: 124 MMHG | RESPIRATION RATE: 14 BRPM | BODY MASS INDEX: 19.76 KG/M2 | WEIGHT: 98 LBS | HEART RATE: 94 BPM | TEMPERATURE: 98 F | OXYGEN SATURATION: 100 % | HEIGHT: 59 IN
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
## 2024-11-13 PROCEDURE — 99284 EMERGENCY DEPT VISIT MOD MDM: CPT | Mod: 25

## 2024-11-13 PROCEDURE — 63600175 PHARM REV CODE 636 W HCPCS: Performed by: CLINICAL NURSE SPECIALIST

## 2024-11-13 PROCEDURE — 90715 TDAP VACCINE 7 YRS/> IM: CPT | Performed by: CLINICAL NURSE SPECIALIST

## 2024-11-13 PROCEDURE — 90471 IMMUNIZATION ADMIN: CPT | Performed by: CLINICAL NURSE SPECIALIST

## 2024-11-13 PROCEDURE — 25000003 PHARM REV CODE 250: Performed by: CLINICAL NURSE SPECIALIST

## 2024-11-13 RX ORDER — MUPIROCIN 20 MG/G
1 OINTMENT TOPICAL 2 TIMES DAILY
Status: DISCONTINUED | OUTPATIENT
Start: 2024-11-13 | End: 2024-11-13 | Stop reason: HOSPADM

## 2024-11-13 RX ORDER — AMOXICILLIN AND CLAVULANATE POTASSIUM 875; 125 MG/1; MG/1
1 TABLET, FILM COATED ORAL 2 TIMES DAILY
Qty: 14 TABLET | Refills: 0 | Status: SHIPPED | OUTPATIENT
Start: 2024-11-13

## 2024-11-13 RX ORDER — AMOXICILLIN AND CLAVULANATE POTASSIUM 875; 125 MG/1; MG/1
1 TABLET, FILM COATED ORAL
Status: COMPLETED | OUTPATIENT
Start: 2024-11-13 | End: 2024-11-13

## 2024-11-13 RX ORDER — HYDROCODONE BITARTRATE AND ACETAMINOPHEN 5; 325 MG/1; MG/1
1 TABLET ORAL
Status: COMPLETED | OUTPATIENT
Start: 2024-11-13 | End: 2024-11-13

## 2024-11-13 RX ORDER — HYDROCODONE BITARTRATE AND ACETAMINOPHEN 5; 325 MG/1; MG/1
1 TABLET ORAL EVERY 6 HOURS PRN
Qty: 12 TABLET | Refills: 0 | Status: SHIPPED | OUTPATIENT
Start: 2024-11-13

## 2024-11-13 RX ORDER — MUPIROCIN 20 MG/G
OINTMENT TOPICAL 2 TIMES DAILY
Qty: 15 G | Refills: 0 | Status: SHIPPED | OUTPATIENT
Start: 2024-11-13

## 2024-11-13 RX ADMIN — MUPIROCIN 1 TUBE: 20 OINTMENT TOPICAL at 12:11

## 2024-11-13 RX ADMIN — AMOXICILLIN AND CLAVULANATE POTASSIUM 1 TABLET: 875; 125 TABLET, FILM COATED ORAL at 12:11

## 2024-11-13 RX ADMIN — HYDROCODONE BITARTRATE AND ACETAMINOPHEN 1 TABLET: 5; 325 TABLET ORAL at 12:11

## 2024-11-13 RX ADMIN — TETANUS TOXOID, REDUCED DIPHTHERIA TOXOID AND ACELLULAR PERTUSSIS VACCINE, ADSORBED 0.5 ML: 5; 2.5; 8; 8; 2.5 SUSPENSION INTRAMUSCULAR at 12:11

## 2024-11-13 NOTE — ED PROVIDER NOTES
Encounter Date: 11/13/2024       History     Chief Complaint   Patient presents with    Animal Bite     Reports homeless man's dog bit my right hand JPTA on [address] [facility]     19-year-old female presents to the emergency room with dog bite to right hand after being bitten by a homeless man dog prior to arrival.  Patient unsure if she is up-to-date with tetanus.  Patient tearful during assessment.  Patient is concerned about infection        Review of patient's allergies indicates:  No Known Allergies  Past Medical History:   Diagnosis Date    COVID     HX OF    Hyperemesis gravidarum      Past Surgical History:   Procedure Laterality Date    HERNIA REPAIR      SALPINGOOPHORECTOMY Left 8/16/2022    Procedure: SALPINGO-OOPHORECTOMY;  Surgeon: John Mcbride MD;  Location: [facility];  Service: OB/GYN;  Laterality: Left;  unilateral     TONSILLECTOMY       Family History   Problem Relation Name Age of Onset    No Known Problems Mother      No Known Problems Father       Social History     Tobacco Use    Smoking status: Every Day     Types: Vaping with nicotine    Smokeless tobacco: Never   Substance Use Topics    Alcohol use: Yes     Comment: occ use    Drug use: Yes     Types: Marijuana     Comment: pt reports prescribed valium and hydrocodone     Review of Systems   Constitutional:  Negative for fever.   HENT:  Negative for sore throat.    Respiratory:  Negative for shortness of breath.    Cardiovascular:  Negative for chest pain.   Gastrointestinal:  Negative for nausea.   Genitourinary:  Negative for dysuria.   Musculoskeletal:  Negative for back pain.   Skin:  Positive for color change and wound. Negative for rash.   Neurological:  Negative for weakness.   Hematological:  Does not bruise/bleed easily.   All other systems reviewed and are negative.      Physical Exam     Initial Vitals [11/13/24 1154]   BP Pulse Resp Temp SpO2   129/78 100 18 98.3 °F (36.8 °C) 100 %      MAP       --         Physical  Exam    Nursing note and vitals reviewed.  Constitutional: She appears well-developed and well-nourished.   HENT:   Head: Normocephalic and atraumatic.   Eyes: Pupils are equal, round, and reactive to light.   Neck:   Normal range of motion.  Musculoskeletal:         General: Normal range of motion.      Cervical back: Normal range of motion.     Neurological: She is alert and oriented to person, place, and time.   Skin: Skin is warm and dry.   Puncture wound noted to anterior and posterior of right hand.  Minimal bleeding noted.   Psychiatric: She has a normal mood and affect.         ED Course   Procedures  Labs Reviewed - No data to display       Imaging Results              X-Ray Hand 3 view Right (In process)                      Medications   mupirocin 2 % ointment 1 Tube (1 Tube Topical (Top) Given 11/13/24 1247)   Tdap (BOOSTRIX) vaccine injection 0.5 mL (0.5 mLs Intramuscular Given 11/13/24 1247)   amoxicillin-clavulanate 875-125mg per tablet 1 tablet (1 tablet Oral Given 11/13/24 1246)   HYDROcodone-acetaminophen 5-325 mg per tablet 1 tablet (1 tablet Oral Given 11/13/24 1246)     Medical Decision Making                                    Clinical Impression:  Final diagnoses:  [W54.0XXA] Dog bite, initial encounter (Primary)          ED Disposition Condition    Discharge Stable          ED Prescriptions    None       Follow-up Information    None          Cierra Winn NP  11/13/24 0134

## 2025-01-12 ENCOUNTER — HOSPITAL ENCOUNTER (EMERGENCY)
Facility: HOSPITAL | Age: 20
Discharge: HOME | End: 2025-01-12
Attending: EMERGENCY MEDICINE
Payer: MEDICAID

## 2025-01-12 VITALS
RESPIRATION RATE: 14 BRPM | BODY MASS INDEX: 19.76 KG/M2 | SYSTOLIC BLOOD PRESSURE: 102 MMHG | DIASTOLIC BLOOD PRESSURE: 64 MMHG | HEART RATE: 95 BPM | WEIGHT: 98 LBS | TEMPERATURE: 98 F | HEIGHT: 59 IN | OXYGEN SATURATION: 100 %

## 2025-01-12 DIAGNOSIS — N12 PYELONEPHRITIS: Primary | ICD-10-CM

## 2025-01-12 DIAGNOSIS — R10.9 RIGHT FLANK PAIN: ICD-10-CM

## 2025-01-12 LAB
ALBUMIN SERPL BCP-MCNC: 3.6 G/DL (ref 3.5–5.2)
ALP SERPL-CCNC: 53 U/L (ref 55–135)
ALT SERPL W/O P-5'-P-CCNC: 11 U/L (ref 10–44)
ANION GAP SERPL CALC-SCNC: 10 MMOL/L (ref 8–16)
AST SERPL-CCNC: 13 U/L (ref 10–40)
B-HCG UR QL: NEGATIVE
BACTERIA #/AREA URNS HPF: ABNORMAL /HPF
BASOPHILS # BLD AUTO: 0.06 K/UL (ref 0–0.2)
BASOPHILS NFR BLD: 0.3 % (ref 0–1.9)
BILIRUB SERPL-MCNC: 0.3 MG/DL (ref 0.1–1)
BILIRUB UR QL STRIP: NEGATIVE
BUN SERPL-MCNC: 10 MG/DL (ref 6–20)
CALCIUM SERPL-MCNC: 9.2 MG/DL (ref 8.7–10.5)
CHLORIDE SERPL-SCNC: 106 MMOL/L (ref 95–110)
CLARITY UR: ABNORMAL
CO2 SERPL-SCNC: 23 MMOL/L (ref 23–29)
COLOR UR: YELLOW
CREAT SERPL-MCNC: 0.6 MG/DL (ref 0.5–1.4)
DIFFERENTIAL METHOD BLD: ABNORMAL
EOSINOPHIL # BLD AUTO: 0.1 K/UL (ref 0–0.5)
EOSINOPHIL NFR BLD: 0.3 % (ref 0–8)
ERYTHROCYTE [DISTWIDTH] IN BLOOD BY AUTOMATED COUNT: 14.2 % (ref 11.5–14.5)
EST. GFR  (NO RACE VARIABLE): >60 ML/MIN/1.73 M^2
GLUCOSE SERPL-MCNC: 89 MG/DL (ref 70–110)
GLUCOSE UR QL STRIP: NEGATIVE
HCG INTACT+B SERPL-ACNC: <2.4 MIU/ML
HCT VFR BLD AUTO: 35.6 % (ref 37–48.5)
HGB BLD-MCNC: 11.7 G/DL (ref 12–16)
HGB UR QL STRIP: ABNORMAL
HYALINE CASTS #/AREA URNS LPF: 5.5 /LPF
IMM GRANULOCYTES # BLD AUTO: 0.13 K/UL (ref 0–0.04)
IMM GRANULOCYTES NFR BLD AUTO: 0.6 % (ref 0–0.5)
KETONES UR QL STRIP: NEGATIVE
LEUKOCYTE ESTERASE UR QL STRIP: ABNORMAL
LIPASE SERPL-CCNC: 9 U/L (ref 4–60)
LYMPHOCYTES # BLD AUTO: 1.9 K/UL (ref 1–4.8)
LYMPHOCYTES NFR BLD: 8.9 % (ref 18–48)
MCH RBC QN AUTO: 28.7 PG (ref 27–31)
MCHC RBC AUTO-ENTMCNC: 32.9 G/DL (ref 32–36)
MCV RBC AUTO: 87 FL (ref 82–98)
MICROSCOPIC COMMENT: ABNORMAL
MONOCYTES # BLD AUTO: 2.4 K/UL (ref 0.3–1)
MONOCYTES NFR BLD: 11.4 % (ref 4–15)
NEUTROPHILS # BLD AUTO: 16.8 K/UL (ref 1.8–7.7)
NEUTROPHILS NFR BLD: 78.5 % (ref 38–73)
NITRITE UR QL STRIP: NEGATIVE
NRBC BLD-RTO: 0 /100 WBC
PH UR STRIP: 7 [PH] (ref 5–8)
PLATELET # BLD AUTO: 563 K/UL (ref 150–450)
PMV BLD AUTO: 9.4 FL (ref 9.2–12.9)
POTASSIUM SERPL-SCNC: 4 MMOL/L (ref 3.5–5.1)
PROT SERPL-MCNC: 6.9 G/DL (ref 6–8.4)
PROT UR QL STRIP: ABNORMAL
RBC # BLD AUTO: 4.08 M/UL (ref 4–5.4)
RBC #/AREA URNS HPF: 4 /HPF (ref 0–4)
SODIUM SERPL-SCNC: 139 MMOL/L (ref 136–145)
SP GR UR STRIP: 1.01 (ref 1–1.03)
SQUAMOUS #/AREA URNS HPF: 1 /HPF
URN SPEC COLLECT METH UR: ABNORMAL
UROBILINOGEN UR STRIP-ACNC: NEGATIVE EU/DL
WBC # BLD AUTO: 21.42 K/UL (ref 3.9–12.7)
WBC #/AREA URNS HPF: >100 /HPF (ref 0–5)

## 2025-01-12 PROCEDURE — 96361 HYDRATE IV INFUSION ADD-ON: CPT

## 2025-01-12 PROCEDURE — 25500020 PHARM REV CODE 255: Performed by: EMERGENCY MEDICINE

## 2025-01-12 PROCEDURE — 99285 EMERGENCY DEPT VISIT HI MDM: CPT | Mod: 25

## 2025-01-12 PROCEDURE — 96375 TX/PRO/DX INJ NEW DRUG ADDON: CPT

## 2025-01-12 PROCEDURE — 85025 COMPLETE CBC W/AUTO DIFF WBC: CPT | Performed by: EMERGENCY MEDICINE

## 2025-01-12 PROCEDURE — 96365 THER/PROPH/DIAG IV INF INIT: CPT | Mod: 59

## 2025-01-12 PROCEDURE — 80053 COMPREHEN METABOLIC PANEL: CPT | Performed by: EMERGENCY MEDICINE

## 2025-01-12 PROCEDURE — 87086 URINE CULTURE/COLONY COUNT: CPT | Performed by: EMERGENCY MEDICINE

## 2025-01-12 PROCEDURE — 84702 CHORIONIC GONADOTROPIN TEST: CPT | Performed by: EMERGENCY MEDICINE

## 2025-01-12 PROCEDURE — 83690 ASSAY OF LIPASE: CPT | Performed by: EMERGENCY MEDICINE

## 2025-01-12 PROCEDURE — 81000 URINALYSIS NONAUTO W/SCOPE: CPT | Performed by: EMERGENCY MEDICINE

## 2025-01-12 PROCEDURE — 63600175 PHARM REV CODE 636 W HCPCS: Performed by: EMERGENCY MEDICINE

## 2025-01-12 PROCEDURE — 87186 SC STD MICRODIL/AGAR DIL: CPT | Performed by: EMERGENCY MEDICINE

## 2025-01-12 PROCEDURE — 25000003 PHARM REV CODE 250: Performed by: EMERGENCY MEDICINE

## 2025-01-12 PROCEDURE — 81025 URINE PREGNANCY TEST: CPT | Performed by: EMERGENCY MEDICINE

## 2025-01-12 PROCEDURE — 87088 URINE BACTERIA CULTURE: CPT | Performed by: EMERGENCY MEDICINE

## 2025-01-12 PROCEDURE — 36415 COLL VENOUS BLD VENIPUNCTURE: CPT | Performed by: EMERGENCY MEDICINE

## 2025-01-12 RX ORDER — ONDANSETRON HYDROCHLORIDE 2 MG/ML
4 INJECTION, SOLUTION INTRAVENOUS
Status: COMPLETED | OUTPATIENT
Start: 2025-01-12 | End: 2025-01-12

## 2025-01-12 RX ORDER — CEFDINIR 300 MG/1
300 CAPSULE ORAL 2 TIMES DAILY
Qty: 14 CAPSULE | Refills: 0 | Status: SHIPPED | OUTPATIENT
Start: 2025-01-12 | End: 2025-01-19

## 2025-01-12 RX ORDER — MORPHINE SULFATE 4 MG/ML
4 INJECTION, SOLUTION INTRAMUSCULAR; INTRAVENOUS
Status: COMPLETED | OUTPATIENT
Start: 2025-01-12 | End: 2025-01-12

## 2025-01-12 RX ORDER — ONDANSETRON 8 MG/1
8 TABLET, ORALLY DISINTEGRATING ORAL EVERY 8 HOURS PRN
Qty: 12 TABLET | Refills: 0 | Status: SHIPPED | OUTPATIENT
Start: 2025-01-12

## 2025-01-12 RX ORDER — NAPROXEN 500 MG/1
500 TABLET ORAL EVERY 12 HOURS PRN
Qty: 20 TABLET | Refills: 0 | Status: SHIPPED | OUTPATIENT
Start: 2025-01-12

## 2025-01-12 RX ORDER — CEFTRIAXONE 1 G/1
1 INJECTION, POWDER, FOR SOLUTION INTRAMUSCULAR; INTRAVENOUS
Status: COMPLETED | OUTPATIENT
Start: 2025-01-12 | End: 2025-01-12

## 2025-01-12 RX ORDER — KETOROLAC TROMETHAMINE 30 MG/ML
15 INJECTION, SOLUTION INTRAMUSCULAR; INTRAVENOUS
Status: COMPLETED | OUTPATIENT
Start: 2025-01-12 | End: 2025-01-12

## 2025-01-12 RX ORDER — HYDROCODONE BITARTRATE AND ACETAMINOPHEN 5; 325 MG/1; MG/1
1 TABLET ORAL EVERY 8 HOURS PRN
Qty: 8 TABLET | Refills: 0 | Status: SHIPPED | OUTPATIENT
Start: 2025-01-12

## 2025-01-12 RX ADMIN — CEFTRIAXONE SODIUM 1 G: 1 INJECTION, POWDER, FOR SOLUTION INTRAMUSCULAR; INTRAVENOUS at 01:01

## 2025-01-12 RX ADMIN — PROMETHAZINE HYDROCHLORIDE 25 MG: 25 INJECTION INTRAMUSCULAR; INTRAVENOUS at 12:01

## 2025-01-12 RX ADMIN — SODIUM CHLORIDE 1000 ML: 9 INJECTION, SOLUTION INTRAVENOUS at 10:01

## 2025-01-12 RX ADMIN — MORPHINE SULFATE 4 MG: 4 INJECTION INTRAVENOUS at 10:01

## 2025-01-12 RX ADMIN — IOHEXOL 100 ML: 350 INJECTION, SOLUTION INTRAVENOUS at 12:01

## 2025-01-12 RX ADMIN — KETOROLAC TROMETHAMINE 15 MG: 30 INJECTION, SOLUTION INTRAMUSCULAR; INTRAVENOUS at 12:01

## 2025-01-12 RX ADMIN — ONDANSETRON 4 MG: 2 INJECTION INTRAMUSCULAR; INTRAVENOUS at 10:01

## 2025-01-12 NOTE — ED PROVIDER NOTES
EMERGENCY DEPARTMENT HISTORY AND PHYSICAL EXAM     This note is dictated on M*Modal word recognition program.  There are word recognition mistakes and grammatical errors that are occasionally missed on review.     Date: 1/12/2025   Patient Name: Lilly Lockwood       History of Presenting Illness      Chief Complaint   Patient presents with    Back Pain     Pt stated that since yesterday she has been experiencing pain from back around to RUQ abdomen accompanied with nausea. Denied vomiting / diarrhea. Denied dysuria. Flu diagnosis couple weeks ago, continued URI symptoms as well.            Lilly Lockwood is a 19 y.o. female with PMHX of the left salpingo-oophorectomy 2022 secondary to ovarian cyst, kidney stone who presents to the emergency department C/O right upper quadrant and flank pain.    Patient reports 2 days of right flank pain.  Comes and goes but has been more persisting overnight.  Took Aleve with some brief interval of improvement.  She has nausea but no vomiting or diarrhea.  No  complaints.  Last menstrual cycle was normal.  Had flu a couple weeks ago still has a cough.  Reports pain with movement, lying down, deep inspiration.      PCP: No, Primary Doctor        No current facility-administered medications for this encounter.     Current Outpatient Medications   Medication Sig Dispense Refill    amoxicillin-clavulanate 875-125mg (AUGMENTIN) 875-125 mg per tablet Take 1 tablet by mouth 2 (two) times daily. 14 tablet 0    cefdinir (OMNICEF) 300 MG capsule Take 1 capsule (300 mg total) by mouth 2 (two) times daily. for 7 days 14 capsule 0    HYDROcodone-acetaminophen (NORCO) 5-325 mg per tablet Take 1 tablet by mouth every 6 (six) hours as needed for Pain. 12 tablet 0    HYDROcodone-acetaminophen (NORCO) 5-325 mg per tablet Take 1 tablet by mouth every 8 (eight) hours as needed for Pain (Severe pain). 8 tablet 0    mupirocin (BACTROBAN) 2 % ointment Apply topically 2 (two) times daily.  "15 g 0    naproxen (NAPROSYN) 500 MG tablet Take 1 tablet (500 mg total) by mouth every 12 (twelve) hours as needed (Pain). 20 tablet 0    ondansetron (ZOFRAN-ODT) 8 MG TbDL Take 1 tablet (8 mg total) by mouth every 8 (eight) hours as needed (Nasuea). 12 tablet 0           Past History     Past Medical History:   Past Medical History:   Diagnosis Date    COVID     HX OF    Hyperemesis gravidarum         Past Surgical History:   Past Surgical History:   Procedure Laterality Date    HERNIA REPAIR      SALPINGOOPHORECTOMY Left 8/16/2022    Procedure: SALPINGO-OOPHORECTOMY;  Surgeon: John Mcbride MD;  Location: [facility];  Service: OB/GYN;  Laterality: Left;  unilateral     TONSILLECTOMY          Family History:   Family History   Problem Relation Name Age of Onset    No Known Problems Mother      No Known Problems Father          Social History:   Social History     Tobacco Use    Smoking status: Every Day     Current packs/day: 0.50     Types: Vaping with nicotine, Cigarettes     Passive exposure: Current    Smokeless tobacco: Never   Substance Use Topics    Alcohol use: Yes     Comment: occ use    Drug use: Yes     Types: Marijuana     Comment: pt reports prescribed valium and hydrocodone        Allergies:   Review of patient's allergies indicates:  No Known Allergies       Review of Systems   Review of Systems   See HPI for pertinent positives and negatives       Physical Exam     Vitals:    01/12/25 0912 01/12/25 0914 01/12/25 1004 01/12/25 1327   BP:  106/78  102/64   BP Location:    Left arm   Patient Position:    Lying   Pulse:  110  95   Resp:  20 18 14   Temp:  98.7 °F (37.1 °C)  98.4 °F (36.9 °C)   TempSrc:    Oral   SpO2:  100%  100%   Weight: 44.5 kg (98 lb)      Height: 4' 11" (1.499 m)         Physical Exam  Vitals and nursing note reviewed.   Constitutional:       General: She is in acute distress.      Comments: Appears in pain   HENT:      Head: Normocephalic and atraumatic.      Right Ear: External " ear normal.      Left Ear: External ear normal.      Nose: Nose normal. No congestion or rhinorrhea.      Mouth/Throat:      Mouth: Mucous membranes are moist.   Eyes:      Conjunctiva/sclera: Conjunctivae normal.      Pupils: Pupils are equal, round, and reactive to light.   Cardiovascular:      Rate and Rhythm: Regular rhythm.   Pulmonary:      Effort: Pulmonary effort is normal. No respiratory distress.      Breath sounds: Normal breath sounds. No wheezing.   Abdominal:      General: Abdomen is flat.      Palpations: Abdomen is soft.      Tenderness: There is abdominal tenderness in the right upper quadrant. Negative signs include Obando's sign.   Musculoskeletal:         General: No deformity. Normal range of motion.      Cervical back: Normal range of motion. No rigidity.      Right lower leg: No edema.      Left lower leg: No edema.   Skin:     General: Skin is dry.   Neurological:      General: No focal deficit present.      Mental Status: She is alert and oriented to person, place, and time. Mental status is at baseline.   Psychiatric:         Mood and Affect: Affect is tearful.         Behavior: Behavior normal.              Diagnostic Study Results      Labs -   Recent Results (from the past 12 hours)   CBC Auto Differential    Collection Time: 01/12/25 10:42 AM   Result Value Ref Range    WBC 21.42 (H) 3.90 - 12.70 K/uL    RBC 4.08 4.00 - 5.40 M/uL    Hemoglobin 11.7 (L) 12.0 - 16.0 g/dL    Hematocrit 35.6 (L) 37.0 - 48.5 %    MCV 87 82 - 98 fL    MCH 28.7 27.0 - 31.0 pg    MCHC 32.9 32.0 - 36.0 g/dL    RDW 14.2 11.5 - 14.5 %    Platelets 563 (H) 150 - 450 K/uL    MPV 9.4 9.2 - 12.9 fL    Immature Granulocytes 0.6 (H) 0.0 - 0.5 %    Gran # (ANC) 16.8 (H) 1.8 - 7.7 K/uL    Immature Grans (Abs) 0.13 (H) 0.00 - 0.04 K/uL    Lymph # 1.9 1.0 - 4.8 K/uL    Mono # 2.4 (H) 0.3 - 1.0 K/uL    Eos # 0.1 0.0 - 0.5 K/uL    Baso # 0.06 0.00 - 0.20 K/uL    nRBC 0 0 /100 WBC    Gran % 78.5 (H) 38.0 - 73.0 %    Lymph %  8.9 (L) 18.0 - 48.0 %    Mono % 11.4 4.0 - 15.0 %    Eosinophil % 0.3 0.0 - 8.0 %    Basophil % 0.3 0.0 - 1.9 %    Differential Method Automated    Comprehensive Metabolic Panel    Collection Time: 01/12/25 10:42 AM   Result Value Ref Range    Sodium 139 136 - 145 mmol/L    Potassium 4.0 3.5 - 5.1 mmol/L    Chloride 106 95 - 110 mmol/L    CO2 23 23 - 29 mmol/L    Glucose 89 70 - 110 mg/dL    BUN 10 6 - 20 mg/dL    Creatinine 0.6 0.5 - 1.4 mg/dL    Calcium 9.2 8.7 - 10.5 mg/dL    Total Protein 6.9 6.0 - 8.4 g/dL    Albumin 3.6 3.5 - 5.2 g/dL    Total Bilirubin 0.3 0.1 - 1.0 mg/dL    Alkaline Phosphatase 53 (L) 55 - 135 U/L    AST 13 10 - 40 U/L    ALT 11 10 - 44 U/L    eGFR >60.0 >60 mL/min/1.73 m^2    Anion Gap 10 8 - 16 mmol/L   Lipase    Collection Time: 01/12/25 10:42 AM   Result Value Ref Range    Lipase 9 4 - 60 U/L   HCG, Quantitative    Collection Time: 01/12/25 10:42 AM   Result Value Ref Range    HCG Quant <2.4 See Text mIU/mL   Pregnancy, urine rapid    Collection Time: 01/12/25 11:23 AM   Result Value Ref Range    Preg Test, Ur Negative    Urinalysis, Reflex to Urine Culture Urine, Clean Catch    Collection Time: 01/12/25 11:23 AM    Specimen: Urine, Clean Catch   Result Value Ref Range    Specimen UA Urine, Clean Catch     Color, UA Yellow Yellow, Straw, Rolanda    Appearance, UA Hazy (A) Clear    pH, UA 7.0 5.0 - 8.0    Specific Gravity, UA 1.010 1.005 - 1.030    Protein, UA 1+ (A) Negative    Glucose, UA Negative Negative    Ketones, UA Negative Negative    Bilirubin (UA) Negative Negative    Occult Blood UA 1+ (A) Negative    Nitrite, UA Negative Negative    Urobilinogen, UA Negative <2.0 EU/dL    Leukocytes, UA 1+ (A) Negative   Urinalysis Microscopic    Collection Time: 01/12/25 11:23 AM   Result Value Ref Range    RBC, UA 4 0 - 4 /hpf    WBC, UA >100 (H) 0 - 5 /hpf    Bacteria Few (A) None-Occ /hpf    Squam Epithel, UA 1 /hpf    Hyaline Casts, UA 5.5 (A) 0-1/lpf /lpf    Microscopic Comment SEE  COMMENT         Radiologic Studies -    X-Ray Chest 1 View   Final Result      No evidence of cardiopulmonary disease.         Electronically signed by: Issac Lraa MD   Date:    01/12/2025   Time:    10:50      CT Abdomen Pelvis With IV Contrast NO Oral Contrast    (Results Pending)        Medications given in the ED-   Medications   sodium chloride 0.9% bolus 1,000 mL 1,000 mL (0 mLs Intravenous Stopped 1/12/25 1101)   morphine injection 4 mg (4 mg Intravenous Given 1/12/25 1004)   ondansetron injection 4 mg (4 mg Intravenous Given 1/12/25 1007)   ketorolac injection 15 mg (15 mg Intravenous Given 1/12/25 1207)   promethazine (PHENERGAN) 25 mg in 0.9% NaCl 50 mL IVPB (0 mg Intravenous Stopped 1/12/25 1246)   iohexoL (OMNIPAQUE 350) injection 100 mL (100 mLs Intravenous Given 1/12/25 1246)   cefTRIAXone injection 1 g (1 g Intravenous Given 1/12/25 1306)           Medical Decision Making    I am the first provider for this patient.     I reviewed the vital signs, available nursing notes, past medical history, past surgical history, family history and social history.     Vital Signs:  Reviewed the patient's vital signs.     Pulse Oximetry Analysis and Interpretation:    100% on Room Air, normal        External Test Results (Pertinent to encounter):    Records Reviewed: Nursing Notes, Current Prescription Medications, and External Medical Records     History Obtained By: Patient    Provider Notes: Lilly Lockwood is a 19 y.o. female with right upper quadrant and right flank pain    Co-morbidities Considered:     Differential Diagnosis:  Pleural effusion, pneumothorax, biliary colic, pancreatitis, renal colic, hydronephrosis      ED Course:    Is a otherwise healthy 19-year-old female complains of 2 days of right flank and upper abdominal pain.  Patient tearful,.  Two being significant pain.  Improved with pain relief in emergency department.  No vomiting in ED.  Patient received IV fluids.    Labs were  obtained which demonstrated leukocytosis and urinalysis consistent with UTI.  CT abdomen pelvis was obtained to evaluate for possible obstructive etiology and this demonstrated uncomplicated right-sided pyelonephritis.    Discussed this result with her.  Patient felt better after treatment in ED. tolerating p.o..  Vital signs within normal limits.  Patient is not toxic.  Discussed symptom management including pain control, nausea control, and an antibiotic course for pyelonephritis.  Discussed typical course.  Discussed with patient and her significant other that if her symptoms progress or she develops inability to tolerate fluids or oral antibiotics she would need to return to the ED for possible admission.  At this time patient seems like an appropriate candidate for outpatient mangement. Patient expressed understanding and agreement with this plan    ED Course as of 01/12/25 1338   Sun Jan 12, 2025   1102 WBC(!): 21.42 [MO]   1132 Beta HCG Quant: <2.4 [MO]   1138 WBC, UA(!): >100 [MO]   1138 Bacteria, UA(!): Few [MO]      ED Course User Index  [MO] Armani Laboy MD       Problems Addressed:  Pyelonephritis    Procedures:   Procedures       Diagnosis and Disposition     Critical Care:      DISCHARGE NOTE:       Lilly Lockwood's  results have been reviewed with her.  She has been counseled regarding her diagnosis, treatment, and plan.  She verbally conveys understanding and agreement of the signs, symptoms, diagnosis, treatment and prognosis and additionally agrees to follow up as discussed.  She also agrees with the care-plan and conveys that all of her questions have been answered.  I have also provided discharge instructions for her that include: educational information regarding their diagnosis and treatment, and list of reasons why they would want to return to the ED prior to their follow-up appointment, should her condition change. She has been provided with education for proper emergency  department utilization.         CLINICAL IMPRESSION:         1. Pyelonephritis    2. Right flank pain              PLAN:   1. Discharge Home  2.      Medication List        START taking these medications      cefdinir 300 MG capsule  Commonly known as: OMNICEF  Take 1 capsule (300 mg total) by mouth 2 (two) times daily. for 7 days     naproxen 500 MG tablet  Commonly known as: NAPROSYN  Take 1 tablet (500 mg total) by mouth every 12 (twelve) hours as needed (Pain).     ondansetron 8 MG Tbdl  Commonly known as: ZOFRAN-ODT  Take 1 tablet (8 mg total) by mouth every 8 (eight) hours as needed (Nasuea).            CHANGE how you take these medications      * HYDROcodone-acetaminophen 5-325 mg per tablet  Commonly known as: NORCO  Take 1 tablet by mouth every 6 (six) hours as needed for Pain.  What changed: Another medication with the same name was added. Make sure you understand how and when to take each.     * HYDROcodone-acetaminophen 5-325 mg per tablet  Commonly known as: NORCO  Take 1 tablet by mouth every 8 (eight) hours as needed for Pain (Severe pain).  What changed: You were already taking a medication with the same name, and this prescription was added. Make sure you understand how and when to take each.           * This list has 2 medication(s) that are the same as other medications prescribed for you. Read the directions carefully, and ask your doctor or other care provider to review them with you.                ASK your doctor about these medications      amoxicillin-clavulanate 875-125mg 875-125 mg per tablet  Commonly known as: AUGMENTIN  Take 1 tablet by mouth 2 (two) times daily.     mupirocin 2 % ointment  Commonly known as: BACTROBAN  Apply topically 2 (two) times daily.               Where to Get Your Medications        These medications were sent to [facility] [address]  [address] [facility] [address]      Phone: [phone]   cefdinir 300 MG  capsule  HYDROcodone-acetaminophen 5-325 mg per tablet  naproxen 500 MG tablet  ondansetron 8 MG Tbdl        3. [place] - Emergency Department  [address] [facility] [id number] [phone]  Go to   If symptoms worsen    Your PCP    Schedule an appointment as soon as possible for a visit   Primary care follow up       _______________________________     Please note that this dictation was completed with [employer], the computer voice recognition software.  Quite often unanticipated grammatical, syntax, homophones, and other interpretive errors are inadvertently transcribed by the computer software.  Please disregard these errors.  Please excuse any errors that have escaped final proofreading.             Armani Laboy MD  01/12/25 2621

## 2025-01-12 NOTE — ED NOTES
NEUROLOGICAL:   Patient is awake , alert , and oriented x 4 .   Moves all extremities without difficulty.   Patient reports no neuro complaints..  GCS 15    CARDIOVASCULAR:   S1 and S2 present, no murmurs, gallops, or rubs, rate regular , and pulses palpable (2+)    On palpation no edema noted , noted to none.   Patient reports no CV complaints.  .     RESPIRATORY:   Airway Clear, Open, and Patent.  Respirations are even and unlabored.   Breath sounds clear  to all lung fields.   Patient reports no respiratory complaints.     GASTROINTESTINAL:   Abdomen is tender right lower quadrant. Bowel sounds are normoactive to all quadrants .   Patient reports no GI complaints .     SKIN:   Skin appears warm , dry , good turgor, color normal for race, and intact.

## 2025-01-14 LAB — BACTERIA UR CULT: ABNORMAL

## 2025-06-08 ENCOUNTER — HOSPITAL ENCOUNTER (EMERGENCY)
Facility: HOSPITAL | Age: 20
Discharge: HOME | End: 2025-06-08
Attending: EMERGENCY MEDICINE
Payer: MEDICAID

## 2025-06-08 VITALS
OXYGEN SATURATION: 100 % | DIASTOLIC BLOOD PRESSURE: 68 MMHG | HEIGHT: 59 IN | RESPIRATION RATE: 14 BRPM | HEART RATE: 65 BPM | BODY MASS INDEX: 21.42 KG/M2 | TEMPERATURE: 98 F | WEIGHT: 106.25 LBS | SYSTOLIC BLOOD PRESSURE: 105 MMHG

## 2025-06-08 DIAGNOSIS — R11.2 NAUSEA AND VOMITING, UNSPECIFIED VOMITING TYPE: Primary | ICD-10-CM

## 2025-06-08 LAB
ABSOLUTE EOSINOPHIL (OHS): 0.07 K/UL
ABSOLUTE MONOCYTE (OHS): 0.98 K/UL (ref 0.3–1)
ABSOLUTE NEUTROPHIL COUNT (OHS): 12.82 K/UL (ref 1.8–7.7)
ALBUMIN SERPL BCP-MCNC: 4.5 G/DL (ref 3.5–5.2)
ALP SERPL-CCNC: 54 UNIT/L (ref 40–150)
ALT SERPL W/O P-5'-P-CCNC: 15 UNIT/L (ref 10–44)
ANION GAP (OHS): 9 MMOL/L (ref 8–16)
AST SERPL-CCNC: 19 UNIT/L (ref 11–45)
BASOPHILS # BLD AUTO: 0.07 K/UL
BASOPHILS NFR BLD AUTO: 0.4 %
BILIRUB SERPL-MCNC: 0.5 MG/DL (ref 0.1–1)
BUN SERPL-MCNC: 10 MG/DL (ref 6–20)
CALCIUM SERPL-MCNC: 9.1 MG/DL (ref 8.7–10.5)
CHLORIDE SERPL-SCNC: 107 MMOL/L (ref 95–110)
CO2 SERPL-SCNC: 26 MMOL/L (ref 23–29)
CREAT SERPL-MCNC: 0.7 MG/DL (ref 0.5–1.4)
ERYTHROCYTE [DISTWIDTH] IN BLOOD BY AUTOMATED COUNT: 15.4 % (ref 11.5–14.5)
GFR SERPLBLD CREATININE-BSD FMLA CKD-EPI: >60 ML/MIN/1.73/M2
GLUCOSE SERPL-MCNC: 93 MG/DL (ref 70–110)
HCT VFR BLD AUTO: 37.6 % (ref 37–48.5)
HGB BLD-MCNC: 12.4 GM/DL (ref 12–16)
HOLD SPECIMEN: NORMAL
HOLD SPECIMEN: NORMAL
IMM GRANULOCYTES # BLD AUTO: 0.07 K/UL (ref 0–0.04)
IMM GRANULOCYTES NFR BLD AUTO: 0.4 % (ref 0–0.5)
LIPASE SERPL-CCNC: 10 U/L (ref 4–60)
LYMPHOCYTES # BLD AUTO: 1.81 K/UL (ref 1–4.8)
MCH RBC QN AUTO: 29.7 PG (ref 27–31)
MCHC RBC AUTO-ENTMCNC: 33 G/DL (ref 32–36)
MCV RBC AUTO: 90 FL (ref 82–98)
NUCLEATED RBC (/100WBC) (OHS): 0 /100 WBC
PLATELET # BLD AUTO: 403 K/UL (ref 150–450)
PMV BLD AUTO: 9.5 FL (ref 9.2–12.9)
POTASSIUM SERPL-SCNC: 4 MMOL/L (ref 3.5–5.1)
PROT SERPL-MCNC: 7.2 GM/DL (ref 6–8.4)
RBC # BLD AUTO: 4.18 M/UL (ref 4–5.4)
RELATIVE EOSINOPHIL (OHS): 0.4 %
RELATIVE LYMPHOCYTE (OHS): 11.4 % (ref 18–48)
RELATIVE MONOCYTE (OHS): 6.2 % (ref 4–15)
RELATIVE NEUTROPHIL (OHS): 81.2 % (ref 38–73)
SODIUM SERPL-SCNC: 142 MMOL/L (ref 136–145)
WBC # BLD AUTO: 15.82 K/UL (ref 3.9–12.7)

## 2025-06-08 PROCEDURE — 36415 COLL VENOUS BLD VENIPUNCTURE: CPT | Performed by: CLINICAL NURSE SPECIALIST

## 2025-06-08 PROCEDURE — 80053 COMPREHEN METABOLIC PANEL: CPT | Performed by: CLINICAL NURSE SPECIALIST

## 2025-06-08 PROCEDURE — 96374 THER/PROPH/DIAG INJ IV PUSH: CPT

## 2025-06-08 PROCEDURE — 83690 ASSAY OF LIPASE: CPT | Performed by: CLINICAL NURSE SPECIALIST

## 2025-06-08 PROCEDURE — 85025 COMPLETE CBC W/AUTO DIFF WBC: CPT | Performed by: CLINICAL NURSE SPECIALIST

## 2025-06-08 PROCEDURE — 99285 EMERGENCY DEPT VISIT HI MDM: CPT | Mod: 25

## 2025-06-08 PROCEDURE — 25000003 PHARM REV CODE 250: Performed by: CLINICAL NURSE SPECIALIST

## 2025-06-08 PROCEDURE — 96361 HYDRATE IV INFUSION ADD-ON: CPT

## 2025-06-08 PROCEDURE — 25500020 PHARM REV CODE 255: Performed by: EMERGENCY MEDICINE

## 2025-06-08 PROCEDURE — 63600175 PHARM REV CODE 636 W HCPCS: Performed by: CLINICAL NURSE SPECIALIST

## 2025-06-08 RX ORDER — ONDANSETRON HYDROCHLORIDE 2 MG/ML
4 INJECTION, SOLUTION INTRAVENOUS
Status: COMPLETED | OUTPATIENT
Start: 2025-06-08 | End: 2025-06-08

## 2025-06-08 RX ORDER — PROMETHAZINE HYDROCHLORIDE 25 MG/1
25 SUPPOSITORY RECTAL EVERY 6 HOURS PRN
Qty: 10 SUPPOSITORY | Refills: 0 | Status: SHIPPED | OUTPATIENT
Start: 2025-06-08

## 2025-06-08 RX ORDER — ONDANSETRON 4 MG/1
4 TABLET, ORALLY DISINTEGRATING ORAL EVERY 6 HOURS PRN
Qty: 10 TABLET | Refills: 0 | Status: SHIPPED | OUTPATIENT
Start: 2025-06-08

## 2025-06-08 RX ADMIN — ONDANSETRON 4 MG: 2 INJECTION INTRAMUSCULAR; INTRAVENOUS at 01:06

## 2025-06-08 RX ADMIN — SODIUM CHLORIDE 1000 ML: 9 INJECTION, SOLUTION INTRAVENOUS at 01:06

## 2025-06-08 RX ADMIN — IOHEXOL 100 ML: 350 INJECTION, SOLUTION INTRAVENOUS at 02:06

## 2025-06-08 NOTE — ED PROVIDER NOTES
"Encounter Date: 6/8/2025       History     Chief Complaint   Patient presents with    Vomiting     Pt states that she went out last night and woke up this morning vomiting blood, body aches, weak, and feeling dehydrated.      20-year-old female presents to the emergency room with nausea, vomiting, body aches, weakness since last night.  Patient states she feels "dehydrated".  Patient did go out last night and drank hard liquor but nothing out her normal routine.        Review of patient's allergies indicates:  No Known Allergies  Past Medical History:   Diagnosis Date    COVID     HX OF    Hyperemesis gravidarum      Past Surgical History:   Procedure Laterality Date    HERNIA REPAIR      SALPINGOOPHORECTOMY Left 8/16/2022    Procedure: SALPINGO-OOPHORECTOMY;  Surgeon: John Mcbride MD;  Location: [facility];  Service: OB/GYN;  Laterality: Left;  unilateral     TONSILLECTOMY       Family History   Problem Relation Name Age of Onset    No Known Problems Mother      No Known Problems Father       Social History[1]  Review of Systems   Constitutional:  Negative for fever.   HENT:  Negative for sore throat.    Respiratory:  Negative for shortness of breath.    Cardiovascular:  Negative for chest pain.   Gastrointestinal:  Positive for abdominal pain, diarrhea, nausea and vomiting.   Genitourinary:  Negative for dysuria.   Musculoskeletal:  Positive for arthralgias and myalgias. Negative for back pain.   Skin:  Negative for rash.   Neurological:  Positive for weakness.   Hematological:  Does not bruise/bleed easily.   All other systems reviewed and are negative.      Physical Exam     Initial Vitals [06/08/25 1249]   BP Pulse Resp Temp SpO2   102/73 95 18 97.6 °F (36.4 °C) 100 %      MAP       --         Physical Exam    Nursing note and vitals reviewed.  Constitutional: She appears well-developed and well-nourished.   HENT:   Head: Normocephalic and atraumatic.   Eyes: Pupils are equal, round, and reactive to light. "   Neck:   Normal range of motion.  Cardiovascular:  Normal rate and regular rhythm.           Pulmonary/Chest: Breath sounds normal.   Abdominal: Abdomen is soft. Bowel sounds are normal.   Musculoskeletal:         General: Normal range of motion.      Cervical back: Normal range of motion.     Neurological: She is alert and oriented to person, place, and time.   Skin: Skin is warm and dry.   Psychiatric: She has a normal mood and affect.         ED Course   Procedures  Labs Reviewed   CBC WITH DIFFERENTIAL - Abnormal       Result Value    WBC 15.82 (*)     RBC 4.18      HGB 12.4      HCT 37.6      MCV 90      MCH 29.7      MCHC 33.0      RDW 15.4 (*)     Platelet Count 403      MPV 9.5      Nucleated RBC 0      Neut % 81.2 (*)     Lymph % 11.4 (*)     Mono % 6.2      Eos % 0.4      Basophil % 0.4      Imm Grans % 0.4      Neut # 12.82 (*)     Lymph # 1.81      Mono # 0.98      Eos # 0.07      Baso # 0.07      Imm Grans # 0.07 (*)    COMPREHENSIVE METABOLIC PANEL - Normal    Sodium 142      Potassium 4.0      Chloride 107      CO2 26      Glucose 93      BUN 10      Creatinine 0.7      Calcium 9.1      Protein Total 7.2      Albumin 4.5      Bilirubin Total 0.5      ALP 54      AST 19      ALT 15      Anion Gap 9      eGFR >60     LIPASE - Normal    Lipase Level 10     CBC W/ AUTO DIFFERENTIAL    Narrative:     The following orders were created for panel order CBC auto differential.  Procedure                               Abnormality         Status                     ---------                               -----------         ------                     CBC with Differential[[id number]]       Abnormal            Final result                 Please view results for these tests on the individual orders.   EXTRA TUBES    Narrative:     The following orders were created for panel order EXTRA TUBES.  Procedure                               Abnormality         Status                     ---------                                -----------         ------                     Light Blue Top Hold[[id number]]                             Final result               Gold Top Hold[[id number]]                                   Final result                 Please view results for these tests on the individual orders.   LIGHT BLUE TOP HOLD    Extra Tube Hold for add-ons.     GOLD TOP HOLD    Extra Tube Hold for add-ons.            Imaging Results              CT Abdomen Pelvis With IV Contrast NO Oral Contrast (Final result)  Result time 06/08/25 14:30:27      Final result by Mya Bolanos MD (06/08/25 14:30:27)                   Impression:      1. Thickening of the cassi wall may reflect gastritis  2. Moderately prominent loops of small bowel wall in the mid abdomen may reflect diffuse enteritis or ileus  3. Prominence of the wall of the colon may reflect colitis or incomplete distension  4. Trace free fluid in the pelvis      Electronically signed by: Mya Bolanos MD  Date:    06/08/2025  Time:    14:30               Narrative:    EXAMINATION:  CT ABDOMEN PELVIS WITH IV CONTRAST    CLINICAL HISTORY:  Abdominal pain, acute, nonlocalized;    TECHNIQUE:  Iterative reconstruction technique was used.    CT/Cardiac Nuclear exams in prior 12 months: 1    COMPARISON:  01/12/2025.    FINDINGS:  The lung bases are clear.  The liver, spleen, pancreas, adrenals and kidneys are normal.  There is significant thickening of the wall of the stomach may reflect gastritis.  There is thickening of the wall of small bowel loops particularly in the left and mid abdomen may reflect enteritis or ileus.  There is thickening of the wall of throughout the colon incomplete distension versus mild colitis.  Trace free fluid in the pelvis.  There is no free air.  Appendix is not definitely seen but there is no findings to suggest acute appendicitis.                                       Medications   sodium chloride 0.9% bolus 1,000 mL 1,000 mL (0 mLs Intravenous Stopped  6/8/25 1420)   ondansetron injection 4 mg (4 mg Intravenous Given 6/8/25 1310)   iohexoL (OMNIPAQUE 350) injection 100 mL (100 mLs Intravenous Given 6/8/25 1404)     Medical Decision Making  Amount and/or Complexity of Data Reviewed  Labs: ordered.  Radiology: ordered.    Risk  Prescription drug management.                                      Clinical Impression:  Final diagnoses:  [R11.2] Nausea and vomiting, unspecified vomiting type (Primary)          ED Disposition Condition    Discharge Stable          ED Prescriptions       Medication Sig Dispense Start Date End Date Auth. Provider    ondansetron (ZOFRAN-ODT) 4 MG TbDL Take 1 tablet (4 mg total) by mouth every 6 (six) hours as needed. 10 tablet 6/8/2025 -- Cierra Winn NP    promethazine (PHENERGAN) 25 MG suppository Place 1 suppository (25 mg total) rectally every 6 (six) hours as needed for Nausea. 10 suppository 6/8/2025 -- Cierra Winn NP          Follow-up Information    None                [1]   Social History  Tobacco Use    Smoking status: Every Day     Current packs/day: 0.50     Types: Vaping with nicotine, Cigarettes     Passive exposure: Current    Smokeless tobacco: Never   Substance Use Topics    Alcohol use: Yes     Comment: occ use    Drug use: Yes     Types: Marijuana     Comment: pt reports prescribed valium and hydrocodone        Cierra Winn NP  06/08/25 8890

## 2025-06-08 NOTE — DISCHARGE INSTRUCTIONS
Follow-up with PCP or emergency room if you develop any fever, chills, severe abdominal pain.    Avoid alcohol use

## (undated) DEVICE — SUT SILK 2-0 STRANDS 30IN

## (undated) DEVICE — SUT CHROMIC 3-0 SH 27IN GUT

## (undated) DEVICE — SUT VICRYL PLUS 4-0 PS2 27

## (undated) DEVICE — ELECTRODE REM PLYHSV RETURN 9

## (undated) DEVICE — SPONGE LAP 18X18 PREWASHED

## (undated) DEVICE — SEE L#120831

## (undated) DEVICE — SOL PVP-I SCRUB 7.5% 4OZ

## (undated) DEVICE — TRAY FOLEY 16FR INFECTION CONT

## (undated) DEVICE — DRAPE UNDERBUTTOCKS PCH STRL

## (undated) DEVICE — SUT PLAIN GUT 2-0

## (undated) DEVICE — LEGGING CLEAR POLY 2/PACK

## (undated) DEVICE — DRESSING TELFA N ADH 3X8

## (undated) DEVICE — TOWEL OR DISP STRL BLUE 4/PK

## (undated) DEVICE — UNDERGLOVES BIOGEL PI SIZE 8

## (undated) DEVICE — SUT 1 36IN PDS II VIO MONO

## (undated) DEVICE — SUT SILK 2-0 SH 18IN BLACK

## (undated) DEVICE — KIT WING PAD POSITIONING

## (undated) DEVICE — DRAPE MEDI-SLUSH XL 44X66IN

## (undated) DEVICE — DRESSING GZ SURGICOUNT 4X8

## (undated) DEVICE — Device

## (undated) DEVICE — PAD PREP CUFFED NS 24X48IN

## (undated) DEVICE — SUT MCRYL PLUS 4-0 PS2 27IN

## (undated) DEVICE — DRAPE LAPSCP CHOLE 122X102X78

## (undated) DEVICE — SCRUB 10% POVIDONE IODINE 4OZ

## (undated) DEVICE — SUT VICRYL 2-0 CT-2 VCP269H

## (undated) DEVICE — SOL WATER STRL IRR 1000ML

## (undated) DEVICE — GLOVE BIOGEL SKINSENSE PI 6.0

## (undated) DEVICE — TRAY DRY SKIN SCRUB PREP

## (undated) DEVICE — GLOVE BIOGEL SKINSENSE PI 7.5

## (undated) DEVICE — APPLICATOR CHLORAPREP ORN 26ML